# Patient Record
Sex: FEMALE | Race: WHITE | NOT HISPANIC OR LATINO | URBAN - METROPOLITAN AREA
[De-identification: names, ages, dates, MRNs, and addresses within clinical notes are randomized per-mention and may not be internally consistent; named-entity substitution may affect disease eponyms.]

---

## 2023-05-07 ENCOUNTER — NURSING HOME VISIT (OUTPATIENT)
Dept: POST ACUTE CARE | Facility: EXTERNAL LOCATION | Age: 88
End: 2023-05-07
Payer: MEDICARE

## 2023-05-07 DIAGNOSIS — S81.801D WOUND OF RIGHT LOWER EXTREMITY, SUBSEQUENT ENCOUNTER: Primary | ICD-10-CM

## 2023-05-07 DIAGNOSIS — D63.1 ANEMIA DUE TO STAGE 4 CHRONIC KIDNEY DISEASE (MULTI): ICD-10-CM

## 2023-05-07 DIAGNOSIS — R62.51 FAILURE TO THRIVE (0-17): ICD-10-CM

## 2023-05-07 DIAGNOSIS — I10 BENIGN ESSENTIAL HYPERTENSION: ICD-10-CM

## 2023-05-07 DIAGNOSIS — K21.9 GERD WITHOUT ESOPHAGITIS: ICD-10-CM

## 2023-05-07 DIAGNOSIS — R63.4 WEIGHT LOSS, NON-INTENTIONAL: ICD-10-CM

## 2023-05-07 DIAGNOSIS — N18.4 ANEMIA DUE TO STAGE 4 CHRONIC KIDNEY DISEASE (MULTI): ICD-10-CM

## 2023-05-07 DIAGNOSIS — N18.4 CKD (CHRONIC KIDNEY DISEASE), STAGE IV (MULTI): ICD-10-CM

## 2023-05-07 PROBLEM — S81.801A WOUND OF RIGHT LEG: Status: ACTIVE | Noted: 2023-05-07

## 2023-05-07 PROCEDURE — 99309 SBSQ NF CARE MODERATE MDM 30: CPT | Performed by: INTERNAL MEDICINE

## 2023-05-07 NOTE — PROGRESS NOTES
Patient ID: Rocio Mendieta is a 89 y.o. female who presents for Skilled services for wounds, PT/OT care plan development and nursing observation and assessment. Document wounds, monitor for infection. Monitor VS and lab work as indicated.  Failure to thrive weight loss outpatient treatment plan update labs with a local medical    Assessment/Plan     Problem List Items Addressed This Visit          Circulatory    Benign essential hypertension     Patients BP readings reviewed and addressed, as we age our arteries turn stiffer and less elastic. Restricting salt consumption and staying physically fit with regular exercise regimen is the only way to keep our vasculature less tonic. Studies have shown that keeping ideal body wt, exercise routine about 140 to 150 minutes a week, eating variety of plant based diet and drinking plentiful water are quite helpful. Monitor BP twice or once a week at home and bring log to be reviewed by me. Uncontrolled BP has long term consequences including heart failure, myocardial infarction, accelerated atherosclerosis and kidney dysfunction. Therapy reviewed and explained.             Digestive    GERD without esophagitis       Genitourinary    CKD (chronic kidney disease), stage IV (CMS/HCC)     CKD and various stages of CKD and significance explained, CKD is very common and rising diagnosis among [place] adults. Main culprits for CKD etiology needs to be attended well. GFR values explained. Nephrotoxic agents has to be avoided, plenty of water consumption is always beneficial. Avoid NSAIDs, always check with MD about usage of OTC medications or any other Rx given by other providers. GFR less then 10 usually will need renal replacement therapy. Episodic check on renal functions needed. Always ask MD about GFR at next visit. Avoid dehydration.             Musculoskeletal    Wound of right leg - Primary     Nutrition evaluation wound care evaluation local skin care            Endocrine/Metabolic  "   Weight loss, non-intentional     Boost twice a day and Remeron or Megace         Failure to thrive (0-17)     Sed rate iron B12 folic acid thyroid checkup psych and nutrition evaluation          This patient was seen for my regular monthly visit, nursing evaluations and nursing notes were reviewed, interim events are reviewed, interim concerns and messages were reviewed as we have communicated with nursing staff.  Any issues with the falls, skin care impairment, declining physical condition are reviewed and noted, diagnosis list were reviewed, list of medications were reviewed, living will related issues were reviewed, overall patient has been doing well, any declining in patient's condition or any change in patient's condition needs to be notified to physician promptly, discussed with nursing staff, if needed would communicate with family.  Patient stays confined here at the facility for long-term care, there are always concerns about long-term care related issues and concerns.  Nursing staff is trying their best to keep patient safe, all sort of measures has been taken to keep patient safe and comfortable.   Source of history: Nurse, Medical personnel, Medical record, Patient.  History limitation: None.      HPI  HPI: Rocio Mendieta is a 89 year old female patient at [place]. This provider was notified last week that patient was losing  weight. Had talked with this patient about this in the past and she states the food does not \"agree with her.\" so she does not  always eat. She states she does not have an appetite. Patients weight on admission was 146# 1/3/2023 and she is down to 134.2#  today. She was started on Remeron 7.5 mg at  on 4/13/2023. Patient took this medication for a few days and has since been  refusing the medication. She states that she does not want to take medication to increase her appetite because it does not matter if  her appetite is increased because she is still unwilling to eat " "the food, she states it is very \"unappetizing.\" She states she is having  her nieces bring her in food that she does like at least once a week and they are bringing her snacks she enjoys. She is requesting  the Remeron be Discontinued.    Histories:  1/3/2023 I12.9 HYPERTENSIVE CHRONIC KIDNEY DISEASE W STG 1-4/UNSP CHR KDNY  1/3/2023 I35.0 NONRHEUMATIC AORTIC (VALVE) STENOSIS  1/3/2023 I73.9 PERIPHERAL VASCULAR DISEASE, UNSPECIFIED  1/3/2023 D50.9 IRON DEFICIENCY ANEMIA, UNSPECIFIED  1/3/2023 K21.9 GASTRO-ESOPHAGEAL REFLUX DISEASE WITHOUT ESOPHAGITIS  1/3/2023 S91.301D UNSPECIFIED OPEN WOUND, RIGHT FOOT, SUBSEQUENT ENCOUNTER  1/3/2023 R62.7 ADULT FAILURE TO THRIVE  1/3/2023 M62.522MUSCLE WASTING AND ATROPHY, NEC, LEFT UPPER ARM  1/18/2023 L08.9 LOCAL INFECTION OF THE SKIN AND SUBCUTANEOUS TISSUE, UNSP  1/3/2023 Z74.1 NEED FOR ASSISTANCE WITH PERSONAL CARE  1/3/2023 R29.6 REPEATED FALLS  1/3/2023 R26.2 DIFFICULTY IN WALKING, NOT ELSEWHERE CLASSIFIED  1/3/2023 M62.81 MUSCLE WEAKNESS (GENERALIZED  1/3/2023 N18.4 CHRONIC KIDNEY DISEASE, STAGE 4 (SEVERE  Not on File    Medications   Order Order Status Revised Last Ordered    Ferrous Sulfate Oral Tablet Delayed Release 324 (65 Fe) MG (Ferrous Sulfate) Active 1/4/2023     Sennosides-Docusate Sodium Oral Tablet 8.6-50 MG (Sennosides-Docusate Sodium) Active 1/25/2023     Fluticasone Propionate Nasal Suspension 50 MCG/ACT (Fluticasone Propionate (Nasal)) Active 1/4/2023   This order is potentially duplicated by other orders on the chart. Click to view details.    Acetaminophen Oral Tablet 325 MG (Acetaminophen) Active 2/15/2023     Amino Acids-Protein Hydrolys Oral Liquid (Amino Acids-Protein Hydrolysate) Active 1/3/2023     Melatonin Oral Tablet 3 MG (Melatonin) Active 1/3/2023     Cholecalciferol Oral Tablet 25 MCG (1000 UT) (Cholecalciferol) Active 1/4/2023   This order is potentially duplicated by other orders on the chart. Click to view details.    Tylenol Extra Strength " Oral Tablet 500 MG (Acetaminophen) Active 1/26/2023 1/26/2023    Saccharomyces boulardii Capsule 250 MG Active 3/16/2023 3/15/2023    Dicyclomine HCl Tablet 20 MG Active 4/10/2023 4/7/2023  No current outpatient medications on file.     No current facility-administered medications for this visit.       Objective   Visit Vitals  Smoking Status Former     Current Vitals   BP: 128/61 mmHg  5/3/2023 15:42  Temp:97.8 °F  5/3/2023 15:42  Pulse:68 bpm  5/3/2023 15:42    Weight:131.9 Lbs  5/2/2023 07:51  Resp:15 Breaths/min  5/3/2023 15:42  BS:  O2:97 %  5/3/2023 15:42  Pain:0  5/3/2023 15:42  PHYSICAL EXAM  General: Weight loss HEENT: PERRLA. EOMI. MMM. Nares patent bl.  Cardiovascular: Heart murmur  Respiratory: Rhonchi  GI: Soft, NT abdomen. BS present x 4.   : No CVAT BL  MSK: Osteoporosis osteoarthritis extremities: No edema. Cap refill < 2 sec.   Skin: Hydration pressure ulcer legs  Neuro: Confusion  Psych: Depression            There is no immunization history on file for this patient.    No visits with results within 4 Month(s) from this visit.   Latest known visit with results is:   No results found for any previous visit.       Radiology: Reviewed imaging in powerchart.  No results found.    No family history on file.  Social History     Socioeconomic History    Marital status:      Spouse name: None    Number of children: None    Years of education: None    Highest education level: None   Occupational History    None   Tobacco Use    Smoking status: Former     Types: Cigarettes    Smokeless tobacco: Never   Vaping Use    Vaping status: None   Substance and Sexual Activity    Alcohol use: Not Currently    Drug use: Not Currently    Sexual activity: None   Other Topics Concern    None   Social History Narrative    None     Social Determinants of Health     Financial Resource Strain: Not on file   Food Insecurity: Not on file   Transportation Needs: Not on file   Physical Activity: Not on file   Stress:  Not on file   Social Connections: Not on file   Intimate Partner Violence: Not on file   Housing Stability: Not on file     History reviewed. No pertinent past medical history.  History reviewed. No pertinent surgical history.  * Cannot find OR log *    Charting was completed using voice recognition technology and may include unintended errors.

## 2023-05-07 NOTE — LETTER
Patient: Rocio Mendieta  : 1927    Encounter Date: 2023    Patient ID: Rocio Mendieta is a 89 y.o. female who presents for Skilled services for wounds, PT/OT care plan development and nursing observation and assessment. Document wounds, monitor for infection. Monitor VS and lab work as indicated.  Failure to thrive weight loss outpatient treatment plan update labs with a local medical    Assessment/Plan    Problem List Items Addressed This Visit          Circulatory    Benign essential hypertension     Patients BP readings reviewed and addressed, as we age our arteries turn stiffer and less elastic. Restricting salt consumption and staying physically fit with regular exercise regimen is the only way to keep our vasculature less tonic. Studies have shown that keeping ideal body wt, exercise routine about 140 to 150 minutes a week, eating variety of plant based diet and drinking plentiful water are quite helpful. Monitor BP twice or once a week at home and bring log to be reviewed by me. Uncontrolled BP has long term consequences including heart failure, myocardial infarction, accelerated atherosclerosis and kidney dysfunction. Therapy reviewed and explained.             Digestive    GERD without esophagitis       Genitourinary    CKD (chronic kidney disease), stage IV (CMS/HCC)     CKD and various stages of CKD and significance explained, CKD is very common and rising diagnosis among [place] adults. Main culprits for CKD etiology needs to be attended well. GFR values explained. Nephrotoxic agents has to be avoided, plenty of water consumption is always beneficial. Avoid NSAIDs, always check with MD about usage of OTC medications or any other Rx given by other providers. GFR less then 10 usually will need renal replacement therapy. Episodic check on renal functions needed. Always ask MD about GFR at next visit. Avoid dehydration.             Musculoskeletal    Wound of right leg - Primary     Nutrition evaluation  "wound care evaluation local skin care            Endocrine/Metabolic    Weight loss, non-intentional     Boost twice a day and Remeron or Megace         Failure to thrive (0-17)     Sed rate iron B12 folic acid thyroid checkup psych and nutrition evaluation          This patient was seen for my regular monthly visit, nursing evaluations and nursing notes were reviewed, interim events are reviewed, interim concerns and messages were reviewed as we have communicated with nursing staff.  Any issues with the falls, skin care impairment, declining physical condition are reviewed and noted, diagnosis list were reviewed, list of medications were reviewed, living will related issues were reviewed, overall patient has been doing well, any declining in patient's condition or any change in patient's condition needs to be notified to physician promptly, discussed with nursing staff, if needed would communicate with family.  Patient stays confined here at the facility for long-term care, there are always concerns about long-term care related issues and concerns.  Nursing staff is trying their best to keep patient safe, all sort of measures has been taken to keep patient safe and comfortable.   Source of history: Nurse, Medical personnel, Medical record, Patient.  History limitation: None.      HPI  HPI: Rocio Mendieta is a 89 year old female patient at [place]. This provider was notified last week that patient was losing  weight. Had talked with this patient about this in the past and she states the food does not \"agree with her.\" so she does not  always eat. She states she does not have an appetite. Patients weight on admission was 146# 1/3/2023 and she is down to 134.2#  today. She was started on Remeron 7.5 mg at  on 4/13/2023. Patient took this medication for a few days and has since been  refusing the medication. She states that she does not want to take medication to increase her appetite because it does not matter " "if  her appetite is increased because she is still unwilling to eat the food, she states it is very \"unappetizing.\" She states she is having  her nieces bring her in food that she does like at least once a week and they are bringing her snacks she enjoys. She is requesting  the Remeron be Discontinued.    Histories:  1/3/2023 I12.9 HYPERTENSIVE CHRONIC KIDNEY DISEASE W STG 1-4/UNSP CHR KDNY  1/3/2023 I35.0 NONRHEUMATIC AORTIC (VALVE) STENOSIS  1/3/2023 I73.9 PERIPHERAL VASCULAR DISEASE, UNSPECIFIED  1/3/2023 D50.9 IRON DEFICIENCY ANEMIA, UNSPECIFIED  1/3/2023 K21.9 GASTRO-ESOPHAGEAL REFLUX DISEASE WITHOUT ESOPHAGITIS  1/3/2023 S91.301D UNSPECIFIED OPEN WOUND, RIGHT FOOT, SUBSEQUENT ENCOUNTER  1/3/2023 R62.7 ADULT FAILURE TO THRIVE  1/3/2023 M62.522MUSCLE WASTING AND ATROPHY, NEC, LEFT UPPER ARM  1/18/2023 L08.9 LOCAL INFECTION OF THE SKIN AND SUBCUTANEOUS TISSUE, UNSP  1/3/2023 Z74.1 NEED FOR ASSISTANCE WITH PERSONAL CARE  1/3/2023 R29.6 REPEATED FALLS  1/3/2023 R26.2 DIFFICULTY IN WALKING, NOT ELSEWHERE CLASSIFIED  1/3/2023 M62.81 MUSCLE WEAKNESS (GENERALIZED  1/3/2023 N18.4 CHRONIC KIDNEY DISEASE, STAGE 4 (SEVERE  Not on File    Medications   Order Order Status Revised Last Ordered    Ferrous Sulfate Oral Tablet Delayed Release 324 (65 Fe) MG (Ferrous Sulfate) Active 1/4/2023     Sennosides-Docusate Sodium Oral Tablet 8.6-50 MG (Sennosides-Docusate Sodium) Active 1/25/2023     Fluticasone Propionate Nasal Suspension 50 MCG/ACT (Fluticasone Propionate (Nasal)) Active 1/4/2023   This order is potentially duplicated by other orders on the chart. Click to view details.    Acetaminophen Oral Tablet 325 MG (Acetaminophen) Active 2/15/2023     Amino Acids-Protein Hydrolys Oral Liquid (Amino Acids-Protein Hydrolysate) Active 1/3/2023     Melatonin Oral Tablet 3 MG (Melatonin) Active 1/3/2023     Cholecalciferol Oral Tablet 25 MCG (1000 UT) (Cholecalciferol) Active 1/4/2023   This order is potentially duplicated by other " orders on the chart. Click to view details.    Tylenol Extra Strength Oral Tablet 500 MG (Acetaminophen) Active 1/26/2023 1/26/2023    Saccharomyces boulardii Capsule 250 MG Active 3/16/2023 3/15/2023    Dicyclomine HCl Tablet 20 MG Active 4/10/2023 4/7/2023  No current outpatient medications on file.     No current facility-administered medications for this visit.       Objective  Visit Vitals  Smoking Status Former     Current Vitals   BP: 128/61 mmHg  5/3/2023 15:42  Temp:97.8 °F  5/3/2023 15:42  Pulse:68 bpm  5/3/2023 15:42    Weight:131.9 Lbs  5/2/2023 07:51  Resp:15 Breaths/min  5/3/2023 15:42  BS:  O2:97 %  5/3/2023 15:42  Pain:0  5/3/2023 15:42  PHYSICAL EXAM  General: Weight loss HEENT: PERRLA. EOMI. MMM. Nares patent bl.  Cardiovascular: Heart murmur  Respiratory: Rhonchi  GI: Soft, NT abdomen. BS present x 4.   : No CVAT BL  MSK: Osteoporosis osteoarthritis extremities: No edema. Cap refill < 2 sec.   Skin: Hydration pressure ulcer legs  Neuro: Confusion  Psych: Depression            There is no immunization history on file for this patient.    No visits with results within 4 Month(s) from this visit.   Latest known visit with results is:   No results found for any previous visit.       Radiology: Reviewed imaging in powerchart.  No results found.    No family history on file.  Social History     Socioeconomic History   • Marital status:      Spouse name: None   • Number of children: None   • Years of education: None   • Highest education level: None   Occupational History   • None   Tobacco Use   • Smoking status: Former     Types: Cigarettes   • Smokeless tobacco: Never   Vaping Use   • Vaping status: None   Substance and Sexual Activity   • Alcohol use: Not Currently   • Drug use: Not Currently   • Sexual activity: None   Other Topics Concern   • None   Social History Narrative   • None     Social Determinants of Health     Financial Resource Strain: Not on file   Food Insecurity: Not on file    Transportation Needs: Not on file   Physical Activity: Not on file   Stress: Not on file   Social Connections: Not on file   Intimate Partner Violence: Not on file   Housing Stability: Not on file     History reviewed. No pertinent past medical history.  History reviewed. No pertinent surgical history.  * Cannot find OR log *    Charting was completed using voice recognition technology and may include unintended errors.           Electronically Signed By: Hari Galvan MD   5/7/23  3:05 PM

## 2023-05-07 NOTE — ASSESSMENT & PLAN NOTE
Patients BP readings reviewed and addressed, as we age our arteries turn stiffer and less elastic. Restricting salt consumption and staying physically fit with regular exercise regimen is the only way to keep our vasculature less tonic. Studies have shown that keeping ideal body wt, exercise routine about 140 to 150 minutes a week, eating variety of plant based diet and drinking plentiful water are quite helpful. Monitor BP twice or once a week at home and bring log to be reviewed by me. Uncontrolled BP has long term consequences including heart failure, myocardial infarction, accelerated atherosclerosis and kidney dysfunction. Therapy reviewed and explained.

## 2023-07-12 ENCOUNTER — NURSING HOME VISIT (OUTPATIENT)
Dept: POST ACUTE CARE | Facility: EXTERNAL LOCATION | Age: 88
End: 2023-07-12
Payer: MEDICARE

## 2023-07-12 DIAGNOSIS — S81.801D WOUND OF RIGHT LOWER EXTREMITY, SUBSEQUENT ENCOUNTER: Primary | ICD-10-CM

## 2023-07-12 DIAGNOSIS — K21.9 GERD WITHOUT ESOPHAGITIS: ICD-10-CM

## 2023-07-12 DIAGNOSIS — R63.4 WEIGHT LOSS, NON-INTENTIONAL: ICD-10-CM

## 2023-07-12 DIAGNOSIS — N18.4 CKD (CHRONIC KIDNEY DISEASE), STAGE IV (MULTI): ICD-10-CM

## 2023-07-12 DIAGNOSIS — I10 BENIGN ESSENTIAL HYPERTENSION: ICD-10-CM

## 2023-07-12 DIAGNOSIS — R62.51 FAILURE TO THRIVE (0-17): ICD-10-CM

## 2023-07-12 PROCEDURE — 99308 SBSQ NF CARE LOW MDM 20: CPT | Performed by: INTERNAL MEDICINE

## 2023-07-12 NOTE — LETTER
Patient: Rocio Mendieta  : 1927    Encounter Date: 2023    Name: Rocio Mendieta  YOB: 1927    FOLLOW UP VISIT:   Allergies: Egg, Fish   Code Status: DNR-CC    Special Instructions: Skilled services for wounds, PT/OT care plan development and nursing observation and assessment. Document wounds, monitor for infection. Monitor VS and lab work as indicated.   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 STAGE 4 CHRONIC KIDNEY DISEASE, OR UNSPECIFIED CHRONIC KIDNEY DISEASE   SUBJECTIVE:This is a 89-year-old patient who had a left lower calf wound IBS anxiety depression insomnia anemia evaluated for pain wound care discussed with RN discussed with the nursing home nurse nurse practitioner advised wound care evaluation dietitian evaluation check sed rate CBC BMP chronic kidney disease associated with the hypertension monitor kidney function liver function    REVIEW OF SYSTEMS:   All review of systems are negative unless otherwise stated above under subjective.    LABS REVIEWED AT FACILITY:    MEDICATIONS REVIEWED AT FACILITY:   Order Order Status Revised Last Ordered    Ferrous Sulfate Oral Tablet Delayed Release 324 (65 Fe) MG (Ferrous Sulfate) Active 2023     Sennosides-Docusate Sodium Oral Tablet 8.6-50 MG (Sennosides-Docusate Sodium) Active 2023     Fluticasone Propionate Nasal Suspension 50 MCG/ACT (Fluticasone Propionate (Nasal)) Active 2023   This order is potentially duplicated by other orders on the chart. Click to view details.    Acetaminophen Oral Tablet 325 MG (Acetaminophen) Active 2/15/2023     Melatonin Oral Tablet 3 MG (Melatonin) Active 1/3/2023     Cholecalciferol Oral Tablet 25 MCG (1000 UT) (Cholecalciferol) Active 2023   This order is potentially duplicated by other orders on the chart. Click to view details.    Tylenol Extra Strength Oral Tablet 500 MG  (Acetaminophen) Active 1/26/2023 1/26/2023    Saccharomyces boulardii Capsule 250 MG Active 3/16/2023 3/15/2023    Dicyclomine HCl Tablet 20 MG  Living will related issues reviewed-Code status:     OBJECTIVE: ICD-10 Short Description  1/3/2023    I12.9 HYPERTENSIVE CHRONIC KIDNEY DISEASE W STG 1-4/UNSP CHR KDNY  1/3/2023    I35.0 NONRHEUMATIC AORTIC (VALVE) STENOSIS  1/3/2023    I73.9 PERIPHERAL VASCULAR DISEASE, UNSPECIFIED  1/3/2023    D50.9 IRON DEFICIENCY ANEMIA, UNSPECIFIED  1/3/2023    K21.9 GASTRO-ESOPHAGEAL REFLUX DISEASE WITHOUT ESOPHAGITIS  1/3/2023    S91.301D UNSPECIFIED OPEN WOUND, RIGHT FOOT, SUBSEQUENT ENCOUNTER  1/3/2023    R62.7 ADULT FAILURE TO THRIVE  1/3/2023    M62.522 MUSCLE WASTING AND ATROPHY, NEC, LEFT UPPER ARM  1/18/2023    L08.9 LOCAL INFECTION OF THE SKIN AND SUBCUTANEOUS TISSUE, UNSP  1/3/2023    Z74.1 NEED FOR ASSISTANCE WITH PERSONAL CARE  1/3/2023    R29.6 REPEATED FALLS  1/3/2023    R26.2 DIFFICULTY IN WALKING, NOT ELSEWHERE CLASSIFIED  1/3/2023    M62.81 MUSCLE WEAKNESS (GENERALIZED  1/3/2023    N18.4 CHRONIC KIDNEY DISEASE, STAGE 4 (SEVERE  There were no vitals taken for this visit.  Physical Exam    Current Vitals   BP: 132/66 mmHg  5/12/2023 14:30  Temp:97.8 °F  5/12/2023 14:30  Pulse:69 bpm  5/12/2023 14:30  Weight:138.2 Lbs  7/5/2023 04:49  Resp:15 Breaths/min  5/12/2023 14:30  BS:  O2:97 %  5/12/2023 14:30  Pain:0  7/17/2023 07:38  Physical Exam:    Appearance: Alert, oriented , cooperative,  in no acute distress. Well nourished & well hydrated.    Skin: Pressure ulcer stage second and third    Eyes: PERRLA, EOMs intact,  Conjunctiva pink with no redness or exudates. Cornea & anterior chamber are clear, Eyelids without lesions. No scleral icterus.     ENT: Hearing grossly intact. External auditory canals patent, tympanic membranes intact with visible landmarks. Nares patent, mucus membranes moist. Dentition without lesions. Pharynx clear, uvula midline.     Neck: JVD  plus  Pulmonary: Crackles rales rhonchi  Cardiac: Heart murmur  Abdomen: Soft, nontender, active bowel sounds.  No palpable organomegaly.  No rebound or guarding.  No CVA tenderness.    Genitourinary: Exam deferred.    Musculoskeletal: Hip pain back pain tenderness  Neurological: Anxiety depression dementia    Psychiatric: Appropriate mood and affect.     Assessment/Plan  Problem List Items Addressed This Visit          Cardiac and Vasculature    Benign essential hypertension       Endocrine/Metabolic    Weight loss, non-intentional    Failure to thrive (0-17)       Gastrointestinal and Abdominal    GERD without esophagitis       Genitourinary and Reproductive    CKD (chronic kidney disease), stage IV (CMS/[facility])       Musculoskeletal and Injuries    Wound of right leg - Primary     This patient was seen for my regular monthly visit, nursing evaluations and nursing notes were reviewed, interim events are reviewed, interim concerns and messages were reviewed as we have communicated with nursing staff.  Any issues with the falls, skin care impairment, declining physical condition are reviewed and noted, diagnosis list were reviewed, list of medications were reviewed, living will related issues were reviewed, overall patient has been doing well, any declining in patient's condition or any change in patient's condition needs to be notified to physician promptly, discussed with nursing staff, if needed would communicate with family.  Patient stays confined here at the facility for long-term care, there are always concerns about long-term care related issues and concerns.  Nursing staff is trying their best to keep patient safe, all sort of measures has been taken to keep patient safe and comfortable.  Consultation taken to the wound care evaluation dietitian evaluation check the CBC BMP TSH protein level  Skin integrity:  Nursing to monitor skin integrity as patient is at risk for pressure injuries.  Wound care per  nursing    See Facility notes for measurements/assessments  Turn and reposition Q 2 hours or more  Air mattress and when up in chair cushion reducing device  Dietician to evaluate and recommend:  Nutritional supplement:  Please monitor skin integrity and other pressure areas  Referral to wound clinic if appropriate:    PLAN:  Pt has been seen for follow up visit.  The patient is here at facility for PT/OT/ST to maximize strength, function, endurance and safety.  The patient is participating in therapy. Rocio Mendieta is making progress to the best of his/her ability.   Please see PT/OT/ST notes in the facility for detailed information regarding progression of patients progress.   Recent nursing evaluation and notes were reviewed.   Overall, patient is stable despite his/her chronic conditions.    #  Any decline or change in condition needs to be communicated with the physician or myself.    Discussion with nursing staff regarding ongoing care and management.  If needed, would communicate with family who are not present at this time.   There are no concerns at this time.  We will continue with the medications noted above.    We will continue to follow the patient here at the facility.    Discharge planning:   Discharge Home when goals are met.   Follow up with PCP in 1-2 weeks after discharge from facility.   [facility] to follow after discharge for continued PT/OT and Nursing.    *Please note that nursing facility, outside laboratory agency, and  [facility] do not interface.     Completion of the note was done through Dragon voice recognition technology and may include   unintended or grammatical errors which may not have been recognized when finalizing the note.     Time:    Hari Galvan MD         Electronically Signed By: Hari Galvan MD   7/17/23  4:30 PM

## 2023-07-17 NOTE — PROGRESS NOTES
Name: Rocio Mendieta  YOB: 1927    FOLLOW UP VISIT:   Allergies: Egg, Fish   Code Status: DNR-CC    Special Instructions: Skilled services for wounds, PT/OT care plan development and nursing observation and assessment. Document wounds, monitor for infection. Monitor VS and lab work as indicated.   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 STAGE 4 CHRONIC KIDNEY DISEASE, OR UNSPECIFIED CHRONIC KIDNEY DISEASE   SUBJECTIVE:This is a 89-year-old patient who had a left lower calf wound IBS anxiety depression insomnia anemia evaluated for pain wound care discussed with RN discussed with the nursing home nurse nurse practitioner advised wound care evaluation dietitian evaluation check sed rate CBC BMP chronic kidney disease associated with the hypertension monitor kidney function liver function    REVIEW OF SYSTEMS:   All review of systems are negative unless otherwise stated above under subjective.    LABS REVIEWED AT FACILITY:    MEDICATIONS REVIEWED AT FACILITY:   Order Order Status Revised Last Ordered    Ferrous Sulfate Oral Tablet Delayed Release 324 (65 Fe) MG (Ferrous Sulfate) Active 1/4/2023     Sennosides-Docusate Sodium Oral Tablet 8.6-50 MG (Sennosides-Docusate Sodium) Active 1/25/2023     Fluticasone Propionate Nasal Suspension 50 MCG/ACT (Fluticasone Propionate (Nasal)) Active 1/4/2023   This order is potentially duplicated by other orders on the chart. Click to view details.    Acetaminophen Oral Tablet 325 MG (Acetaminophen) Active 2/15/2023     Melatonin Oral Tablet 3 MG (Melatonin) Active 1/3/2023     Cholecalciferol Oral Tablet 25 MCG (1000 UT) (Cholecalciferol) Active 1/4/2023   This order is potentially duplicated by other orders on the chart. Click to view details.    Tylenol Extra Strength Oral Tablet 500 MG (Acetaminophen) Active 1/26/2023 1/26/2023    Saccharomyces boulardii Capsule 250  MG Active 3/16/2023 3/15/2023    Dicyclomine HCl Tablet 20 MG  Living will related issues reviewed-Code status:     OBJECTIVE: ICD-10 Short Description  1/3/2023    I12.9 HYPERTENSIVE CHRONIC KIDNEY DISEASE W STG 1-4/UNSP CHR KDNY  1/3/2023    I35.0 NONRHEUMATIC AORTIC (VALVE) STENOSIS  1/3/2023    I73.9 PERIPHERAL VASCULAR DISEASE, UNSPECIFIED  1/3/2023    D50.9 IRON DEFICIENCY ANEMIA, UNSPECIFIED  1/3/2023    K21.9 GASTRO-ESOPHAGEAL REFLUX DISEASE WITHOUT ESOPHAGITIS  1/3/2023    S91.301D UNSPECIFIED OPEN WOUND, RIGHT FOOT, SUBSEQUENT ENCOUNTER  1/3/2023    R62.7 ADULT FAILURE TO THRIVE  1/3/2023    M62.522 MUSCLE WASTING AND ATROPHY, NEC, LEFT UPPER ARM  1/18/2023    L08.9 LOCAL INFECTION OF THE SKIN AND SUBCUTANEOUS TISSUE, UNSP  1/3/2023    Z74.1 NEED FOR ASSISTANCE WITH PERSONAL CARE  1/3/2023    R29.6 REPEATED FALLS  1/3/2023    R26.2 DIFFICULTY IN WALKING, NOT ELSEWHERE CLASSIFIED  1/3/2023    M62.81 MUSCLE WEAKNESS (GENERALIZED  1/3/2023    N18.4 CHRONIC KIDNEY DISEASE, STAGE 4 (SEVERE  There were no vitals taken for this visit.  Physical Exam    Current Vitals   BP: 132/66 mmHg  5/12/2023 14:30  Temp:97.8 °F  5/12/2023 14:30  Pulse:69 bpm  5/12/2023 14:30  Weight:138.2 Lbs  7/5/2023 04:49  Resp:15 Breaths/min  5/12/2023 14:30  BS:  O2:97 %  5/12/2023 14:30  Pain:0  7/17/2023 07:38  Physical Exam:    Appearance: Alert, oriented , cooperative,  in no acute distress. Well nourished & well hydrated.    Skin: Pressure ulcer stage second and third    Eyes: PERRLA, EOMs intact,  Conjunctiva pink with no redness or exudates. Cornea & anterior chamber are clear, Eyelids without lesions. No scleral icterus.     ENT: Hearing grossly intact. External auditory canals patent, tympanic membranes intact with visible landmarks. Nares patent, mucus membranes moist. Dentition without lesions. Pharynx clear, uvula midline.     Neck: JVD plus  Pulmonary: Crackles rales rhonchi  Cardiac: Heart murmur  Abdomen: Soft, nontender,  active bowel sounds.  No palpable organomegaly.  No rebound or guarding.  No CVA tenderness.    Genitourinary: Exam deferred.    Musculoskeletal: Hip pain back pain tenderness  Neurological: Anxiety depression dementia    Psychiatric: Appropriate mood and affect.     Assessment/Plan   Problem List Items Addressed This Visit          Cardiac and Vasculature    Benign essential hypertension       Endocrine/Metabolic    Weight loss, non-intentional    Failure to thrive (0-17)       Gastrointestinal and Abdominal    GERD without esophagitis       Genitourinary and Reproductive    CKD (chronic kidney disease), stage IV (CMS/[facility])       Musculoskeletal and Injuries    Wound of right leg - Primary     This patient was seen for my regular monthly visit, nursing evaluations and nursing notes were reviewed, interim events are reviewed, interim concerns and messages were reviewed as we have communicated with nursing staff.  Any issues with the falls, skin care impairment, declining physical condition are reviewed and noted, diagnosis list were reviewed, list of medications were reviewed, living will related issues were reviewed, overall patient has been doing well, any declining in patient's condition or any change in patient's condition needs to be notified to physician promptly, discussed with nursing staff, if needed would communicate with family.  Patient stays confined here at the facility for long-term care, there are always concerns about long-term care related issues and concerns.  Nursing staff is trying their best to keep patient safe, all sort of measures has been taken to keep patient safe and comfortable.  Consultation taken to the wound care evaluation dietitian evaluation check the CBC BMP TSH protein level  Skin integrity:  Nursing to monitor skin integrity as patient is at risk for pressure injuries.  Wound care per nursing    See Facility notes for measurements/assessments  Turn and reposition Q 2 hours or  more  Air mattress and when up in chair cushion reducing device  Dietician to evaluate and recommend:  Nutritional supplement:  Please monitor skin integrity and other pressure areas  Referral to wound clinic if appropriate:    PLAN:  Pt has been seen for follow up visit.  The patient is here at facility for PT/OT/ST to maximize strength, function, endurance and safety.  The patient is participating in therapy. Rocio Mendieta is making progress to the best of his/her ability.   Please see PT/OT/ST notes in the facility for detailed information regarding progression of patients progress.   Recent nursing evaluation and notes were reviewed.   Overall, patient is stable despite his/her chronic conditions.    #  Any decline or change in condition needs to be communicated with the physician or myself.    Discussion with nursing staff regarding ongoing care and management.  If needed, would communicate with family who are not present at this time.   There are no concerns at this time.  We will continue with the medications noted above.    We will continue to follow the patient here at the facility.    Discharge planning:   Discharge Home when goals are met.   Follow up with PCP in 1-2 weeks after discharge from facility.   [facility] to follow after discharge for continued PT/OT and Nursing.    *Please note that nursing facility, outside laboratory agency, and  [facility] do not interface.     Completion of the note was done through Dragon voice recognition technology and may include   unintended or grammatical errors which may not have been recognized when finalizing the note.     Time:    Hari Galvan MD

## 2023-08-20 ENCOUNTER — NURSING HOME VISIT (OUTPATIENT)
Dept: POST ACUTE CARE | Facility: EXTERNAL LOCATION | Age: 88
End: 2023-08-20
Payer: MEDICARE

## 2023-08-20 DIAGNOSIS — K21.9 GERD WITHOUT ESOPHAGITIS: ICD-10-CM

## 2023-08-20 DIAGNOSIS — N18.4 ANEMIA DUE TO STAGE 4 CHRONIC KIDNEY DISEASE (MULTI): ICD-10-CM

## 2023-08-20 DIAGNOSIS — R63.4 WEIGHT LOSS, NON-INTENTIONAL: ICD-10-CM

## 2023-08-20 DIAGNOSIS — N18.4 CKD (CHRONIC KIDNEY DISEASE), STAGE IV (MULTI): ICD-10-CM

## 2023-08-20 DIAGNOSIS — I10 BENIGN ESSENTIAL HYPERTENSION: ICD-10-CM

## 2023-08-20 DIAGNOSIS — S81.801D WOUND OF RIGHT LOWER EXTREMITY, SUBSEQUENT ENCOUNTER: Primary | ICD-10-CM

## 2023-08-20 DIAGNOSIS — D63.1 ANEMIA DUE TO STAGE 4 CHRONIC KIDNEY DISEASE (MULTI): ICD-10-CM

## 2023-08-20 PROCEDURE — 99309 SBSQ NF CARE MODERATE MDM 30: CPT | Performed by: INTERNAL MEDICINE

## 2023-08-20 NOTE — LETTER
Patient: Rocio Mendieta  : 1927    Encounter Date: 2023    Name: Rocio Mendieta  YOB: 1927    FOLLOW UP VISIT:   Allergies: Egg, Fish   Code Status: DNR-CC    Special Instructions: Skilled services for wounds, PT/OT care plan development and nursing observation and assessment. Document wounds, monitor for infection. Monitor VS and lab work as indicated.   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 STAGE 4 CHRONIC KIDNEY DISEASE, OR UNSPECIFIED CHRONIC KIDNEY DISEASE   SUBJECTIVE:  89-year-old patient DNR CC evaluated for kidney disease anemia skin irritation wound care evaluation advise regular house diet thin liquid consistency ammonium lactate cream application left lower calf wound care management iron B12 folic acid supplement mild dehydration with renal insufficiency increase fluid intake all nephrotoxic medication        WBC 8.16 H&H 11.3 and 36.6 BUN of 42 creatinine 1.9 hemoglobin A1c 5.4    Anemia with chronic kidney disease    REVIEW OF SYSTEMS:   All review of systems are negative unless otherwise stated above under subjective.    LABS REVIEWED AT FACILITY:  Review  MEDICATIONS REVIEWED AT FACILITY:   Order Order Status Revised Last Ordered    Ferrous Sulfate Oral Tablet Delayed Release 324 (65 Fe) MG (Ferrous Sulfate) Active 2023     Sennosides-Docusate Sodium Oral Tablet 8.6-50 MG (Sennosides-Docusate Sodium) Active 2023     Fluticasone Propionate Nasal Suspension 50 MCG/ACT (Fluticasone Propionate (Nasal)) Active 2023   This order is potentially duplicated by other orders on the chart. Click to view details.    Acetaminophen Oral Tablet 325 MG (Acetaminophen) Active 2/15/2023     Melatonin Oral Tablet 3 MG (Melatonin) Active 1/3/2023     Cholecalciferol Oral Tablet 25 MCG (1000 UT) (Cholecalciferol) Active 2023   This order is potentially duplicated by other orders on the  chart. Click to view details.    Tylenol Extra Strength Oral Tablet 500 MG (Acetaminophen) Active 1/26/2023 1/26/2023    Saccharomyces boulardii Capsule 250 MG Active 3/16/2023 3/15/2023    Dicyclomine HCl Tablet 20 MG  Living will related issues reviewed-Code status:     OBJECTIVE: ICD-10 Short Description  1/3/2023    I12.9 HYPERTENSIVE CHRONIC KIDNEY DISEASE W STG 1-4/UNSP CHR KDNY  1/3/2023    I35.0 NONRHEUMATIC AORTIC (VALVE) STENOSIS  1/3/2023    I73.9 PERIPHERAL VASCULAR DISEASE, UNSPECIFIED  1/3/2023    D50.9 IRON DEFICIENCY ANEMIA, UNSPECIFIED  1/3/2023    K21.9 GASTRO-ESOPHAGEAL REFLUX DISEASE WITHOUT ESOPHAGITIS  1/3/2023    S91.301D UNSPECIFIED OPEN WOUND, RIGHT FOOT, SUBSEQUENT ENCOUNTER  1/3/2023    R62.7 ADULT FAILURE TO THRIVE  1/3/2023    M62.522 MUSCLE WASTING AND ATROPHY, NEC, LEFT UPPER ARM  1/18/2023    L08.9 LOCAL INFECTION OF THE SKIN AND SUBCUTANEOUS TISSUE, UNSP  1/3/2023    Z74.1 NEED FOR ASSISTANCE WITH PERSONAL CARE  1/3/2023    R29.6 REPEATED FALLS  1/3/2023    R26.2 DIFFICULTY IN WALKING, NOT ELSEWHERE CLASSIFIED  1/3/2023    M62.81 MUSCLE WEAKNESS (GENERALIZED  1/3/2023    N18.4 CHRONIC KIDNEY DISEASE, STAGE 4 (SEVERE  There were no vitals taken for this visit.  Physical Exam      Current Vitals   BP: 132/66 mmHg  5/12/2023 14:30  Temp:97.8 °F  5/12/2023 14:30  Pulse:69 bpm  5/12/2023 14:30  Weight:139.6 Lbs  8/14/2023 09:23  Resp:15 Breaths/min  5/12/2023 14:30  BS:  O2:97 %  5/12/2023 14:30  Pain:0  8/20/2023 09:13  Appearance: Alert, oriented , cooperative,  in no acute distress. Well nourished & well hydrated.    Skin: Pressure ulcer stage second and third left leg calf pressure ulcer    Eyes: PERRLA, EOMs intact,  Conjunctiva pink with no redness or exudates. Cornea & anterior chamber are clear, Eyelids without lesions. No scleral icterus.     ENT: Minimal wax  Neck: JVD plus  Pulmonary: Crackles rales rhonchi  Cardiac: Heart murmur  Abdomen: Soft, nontender, active bowel sounds.   No palpable organomegaly.  No rebound or guarding.  No CVA tenderness.    Genitourinary: Exam deferred.    Musculoskeletal: Hip pain back pain tenderness  Neurological: Anxiety depression dementia    Psychiatric: Appropriate mood and affect.     Assessment/Plan  Problem List Items Addressed This Visit       Weight loss, non-intentional       Nutritional dietitian evaluation check thyroid sed rate advised Ensure Plus 3 cans a day         GERD without esophagitis    Benign essential hypertension     Patients BP readings reviewed and addressed, as we age our arteries turn stiffer and less elastic. Restricting salt consumption and staying physically fit with regular exercise regimen is the only way to keep our vasculature less tonic. Studies have shown that keeping ideal body wt, exercise routine about 140 to 150 minutes a week, eating variety of plant based diet and drinking plentiful water are quite helpful. Monitor BP twice or once a week at home and bring log to be reviewed by me. Uncontrolled BP has long term consequences including heart failure, myocardial infarction, accelerated atherosclerosis and kidney dysfunction. Therapy reviewed and explained.           Anemia due to stage 4 chronic kidney disease (CMS/HCC)     CKD and various stages of CKD and significance explained, CKD is very common and rising diagnosis among [place] adults. Main culprits for CKD etiology needs to be attended well. GFR values explained. Nephrotoxic agents has to be avoided, plenty of water consumption is always beneficial. Avoid NSAIDs, always check with MD about usage of OTC medications or any other Rx given by other providers. GFR less then 10 usually will need renal replacement therapy. Episodic check on renal functions needed. Always ask MD about GFR at next visit. Avoid dehydration.          Wound of right leg - Primary   This patient was seen for my regular monthly visit, nursing evaluations and nursing notes were reviewed, interim  events are reviewed, interim concerns and messages were reviewed as we have communicated with nursing staff.  Any issues with the falls, skin care impairment, declining physical condition are reviewed and noted, diagnosis list were reviewed, list of medications were reviewed, living will related issues were reviewed, overall patient has been doing well, any declining in patient's condition or any change in patient's condition needs to be notified to physician promptly, discussed with nursing staff, if needed would communicate with family.  Patient stays confined here at the facility for long-term care, there are always concerns about long-term care related issues and concerns.  Nursing staff is trying their best to keep patient safe, all sort of measures has been taken to keep patient safe and comfortable.  Consultation taken to the wound care evaluation dietitian evaluation check the CBC BMP TSH protein level  Skin integrity:  Nursing to monitor skin integrity as patient is at risk for pressure injuries.  Wound care per nursing    See Facility notes for measurements/assessments  Turn and reposition Q 2 hours or more  Air mattress and when up in chair cushion reducing device  Dietician to evaluate and recommend:  Nutritional supplement:  Please monitor skin integrity and other pressure areas  Referral to wound clinic if appropriate:    PLAN:  Pt has been seen for follow up visit.  The patient is here at facility for PT/OT/ST to maximize strength, function, endurance and safety.  The patient is participating in therapy. Rocio Mendieta is making progress to the best of his/her ability.   Please see PT/OT/ST notes in the facility for detailed information regarding progression of patients progress.   Recent nursing evaluation and notes were reviewed.   Overall, patient is stable despite his/her chronic conditions.    #  Any decline or change in condition needs to be communicated with the physician or myself.    Discussion  with nursing staff regarding ongoing care and management.  If needed, would communicate with family who are not present at this time.   There are no concerns at this time.  We will continue with the medications noted above.    We will continue to follow the patient here at the facility.    Discharge planning:   Discharge Home when goals are met.   Follow up with PCP in 1-2 weeks after discharge from facility.   [facility] to follow after discharge for continued PT/OT and Nursing.    *Please note that nursing facility, outside laboratory agency, and [facility] do not interface.     Completion of the note was done through Dragon voice recognition technology and may include   unintended or grammatical errors which may not have been recognized when finalizing the note.     Time:    Hari Galvan MD         Electronically Signed By: Hari Galvan MD   8/20/23  1:51 PM

## 2023-08-20 NOTE — PROGRESS NOTES
Name: Rocio Mendieta  YOB: 1927    FOLLOW UP VISIT:   Allergies: Egg, Fish   Code Status: DNR-CC    Special Instructions: Skilled services for wounds, PT/OT care plan development and nursing observation and assessment. Document wounds, monitor for infection. Monitor VS and lab work as indicated.   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 STAGE 4 CHRONIC KIDNEY DISEASE, OR UNSPECIFIED CHRONIC KIDNEY DISEASE   SUBJECTIVE:  89-year-old patient DNR CC evaluated for kidney disease anemia skin irritation wound care evaluation advise regular house diet thin liquid consistency ammonium lactate cream application left lower calf wound care management iron B12 folic acid supplement mild dehydration with renal insufficiency increase fluid intake all nephrotoxic medication        WBC 8.16 H&H 11.3 and 36.6 BUN of 42 creatinine 1.9 hemoglobin A1c 5.4    Anemia with chronic kidney disease    REVIEW OF SYSTEMS:   All review of systems are negative unless otherwise stated above under subjective.    LABS REVIEWED AT FACILITY:  Review  MEDICATIONS REVIEWED AT FACILITY:   Order Order Status Revised Last Ordered    Ferrous Sulfate Oral Tablet Delayed Release 324 (65 Fe) MG (Ferrous Sulfate) Active 1/4/2023     Sennosides-Docusate Sodium Oral Tablet 8.6-50 MG (Sennosides-Docusate Sodium) Active 1/25/2023     Fluticasone Propionate Nasal Suspension 50 MCG/ACT (Fluticasone Propionate (Nasal)) Active 1/4/2023   This order is potentially duplicated by other orders on the chart. Click to view details.    Acetaminophen Oral Tablet 325 MG (Acetaminophen) Active 2/15/2023     Melatonin Oral Tablet 3 MG (Melatonin) Active 1/3/2023     Cholecalciferol Oral Tablet 25 MCG (1000 UT) (Cholecalciferol) Active 1/4/2023   This order is potentially duplicated by other orders on the chart. Click to view details.    Tylenol Extra Strength Oral Tablet 500  MG (Acetaminophen) Active 1/26/2023 1/26/2023    Saccharomyces boulardii Capsule 250 MG Active 3/16/2023 3/15/2023    Dicyclomine HCl Tablet 20 MG  Living will related issues reviewed-Code status:     OBJECTIVE: ICD-10 Short Description  1/3/2023    I12.9 HYPERTENSIVE CHRONIC KIDNEY DISEASE W STG 1-4/UNSP CHR KDNY  1/3/2023    I35.0 NONRHEUMATIC AORTIC (VALVE) STENOSIS  1/3/2023    I73.9 PERIPHERAL VASCULAR DISEASE, UNSPECIFIED  1/3/2023    D50.9 IRON DEFICIENCY ANEMIA, UNSPECIFIED  1/3/2023    K21.9 GASTRO-ESOPHAGEAL REFLUX DISEASE WITHOUT ESOPHAGITIS  1/3/2023    S91.301D UNSPECIFIED OPEN WOUND, RIGHT FOOT, SUBSEQUENT ENCOUNTER  1/3/2023    R62.7 ADULT FAILURE TO THRIVE  1/3/2023    M62.522 MUSCLE WASTING AND ATROPHY, NEC, LEFT UPPER ARM  1/18/2023    L08.9 LOCAL INFECTION OF THE SKIN AND SUBCUTANEOUS TISSUE, UNSP  1/3/2023    Z74.1 NEED FOR ASSISTANCE WITH PERSONAL CARE  1/3/2023    R29.6 REPEATED FALLS  1/3/2023    R26.2 DIFFICULTY IN WALKING, NOT ELSEWHERE CLASSIFIED  1/3/2023    M62.81 MUSCLE WEAKNESS (GENERALIZED  1/3/2023    N18.4 CHRONIC KIDNEY DISEASE, STAGE 4 (SEVERE  There were no vitals taken for this visit.  Physical Exam      Current Vitals   BP: 132/66 mmHg  5/12/2023 14:30  Temp:97.8 °F  5/12/2023 14:30  Pulse:69 bpm  5/12/2023 14:30  Weight:139.6 Lbs  8/14/2023 09:23  Resp:15 Breaths/min  5/12/2023 14:30  BS:  O2:97 %  5/12/2023 14:30  Pain:0  8/20/2023 09:13  Appearance: Alert, oriented , cooperative,  in no acute distress. Well nourished & well hydrated.    Skin: Pressure ulcer stage second and third left leg calf pressure ulcer    Eyes: PERRLA, EOMs intact,  Conjunctiva pink with no redness or exudates. Cornea & anterior chamber are clear, Eyelids without lesions. No scleral icterus.     ENT: Minimal wax  Neck: JVD plus  Pulmonary: Crackles rales rhonchi  Cardiac: Heart murmur  Abdomen: Soft, nontender, active bowel sounds.  No palpable organomegaly.  No rebound or guarding.  No CVA  tenderness.    Genitourinary: Exam deferred.    Musculoskeletal: Hip pain back pain tenderness  Neurological: Anxiety depression dementia    Psychiatric: Appropriate mood and affect.     Assessment/Plan   Problem List Items Addressed This Visit       Weight loss, non-intentional       Nutritional dietitian evaluation check thyroid sed rate advised Ensure Plus 3 cans a day         GERD without esophagitis    Benign essential hypertension     Patients BP readings reviewed and addressed, as we age our arteries turn stiffer and less elastic. Restricting salt consumption and staying physically fit with regular exercise regimen is the only way to keep our vasculature less tonic. Studies have shown that keeping ideal body wt, exercise routine about 140 to 150 minutes a week, eating variety of plant based diet and drinking plentiful water are quite helpful. Monitor BP twice or once a week at home and bring log to be reviewed by me. Uncontrolled BP has long term consequences including heart failure, myocardial infarction, accelerated atherosclerosis and kidney dysfunction. Therapy reviewed and explained.           Anemia due to stage 4 chronic kidney disease (CMS/HCC)     CKD and various stages of CKD and significance explained, CKD is very common and rising diagnosis among [place] adults. Main culprits for CKD etiology needs to be attended well. GFR values explained. Nephrotoxic agents has to be avoided, plenty of water consumption is always beneficial. Avoid NSAIDs, always check with MD about usage of OTC medications or any other Rx given by other providers. GFR less then 10 usually will need renal replacement therapy. Episodic check on renal functions needed. Always ask MD about GFR at next visit. Avoid dehydration.          Wound of right leg - Primary   This patient was seen for my regular monthly visit, nursing evaluations and nursing notes were reviewed, interim events are reviewed, interim concerns and messages were  reviewed as we have communicated with nursing staff.  Any issues with the falls, skin care impairment, declining physical condition are reviewed and noted, diagnosis list were reviewed, list of medications were reviewed, living will related issues were reviewed, overall patient has been doing well, any declining in patient's condition or any change in patient's condition needs to be notified to physician promptly, discussed with nursing staff, if needed would communicate with family.  Patient stays confined here at the facility for long-term care, there are always concerns about long-term care related issues and concerns.  Nursing staff is trying their best to keep patient safe, all sort of measures has been taken to keep patient safe and comfortable.  Consultation taken to the wound care evaluation dietitian evaluation check the CBC BMP TSH protein level  Skin integrity:  Nursing to monitor skin integrity as patient is at risk for pressure injuries.  Wound care per nursing    See Facility notes for measurements/assessments  Turn and reposition Q 2 hours or more  Air mattress and when up in chair cushion reducing device  Dietician to evaluate and recommend:  Nutritional supplement:  Please monitor skin integrity and other pressure areas  Referral to wound clinic if appropriate:    PLAN:  Pt has been seen for follow up visit.  The patient is here at facility for PT/OT/ST to maximize strength, function, endurance and safety.  The patient is participating in therapy. Rocio Mendieta is making progress to the best of his/her ability.   Please see PT/OT/ST notes in the facility for detailed information regarding progression of patients progress.   Recent nursing evaluation and notes were reviewed.   Overall, patient is stable despite his/her chronic conditions.    #  Any decline or change in condition needs to be communicated with the physician or myself.    Discussion with nursing staff regarding ongoing care and  management.  If needed, would communicate with family who are not present at this time.   There are no concerns at this time.  We will continue with the medications noted above.    We will continue to follow the patient here at the facility.    Discharge planning:   Discharge Home when goals are met.   Follow up with PCP in 1-2 weeks after discharge from facility.   C to follow after discharge for continued PT/OT and Nursing.    *Please note that nursing facility, outside laboratory agency, and  [facility] do not interface.     Completion of the note was done through Dragon voice recognition technology and may include   unintended or grammatical errors which may not have been recognized when finalizing the note.     Time:    Hari Galvan MD

## 2023-09-29 ENCOUNTER — NURSING HOME VISIT (OUTPATIENT)
Dept: POST ACUTE CARE | Facility: EXTERNAL LOCATION | Age: 88
End: 2023-09-29
Payer: MEDICARE

## 2023-09-29 DIAGNOSIS — N18.4 CKD (CHRONIC KIDNEY DISEASE), STAGE IV (MULTI): Primary | ICD-10-CM

## 2023-09-29 DIAGNOSIS — I10 BENIGN ESSENTIAL HYPERTENSION: ICD-10-CM

## 2023-09-29 DIAGNOSIS — S81.801D WOUND OF RIGHT LOWER EXTREMITY, SUBSEQUENT ENCOUNTER: ICD-10-CM

## 2023-09-29 DIAGNOSIS — K21.9 GERD WITHOUT ESOPHAGITIS: ICD-10-CM

## 2023-09-29 DIAGNOSIS — N18.4 ANEMIA DUE TO STAGE 4 CHRONIC KIDNEY DISEASE (MULTI): ICD-10-CM

## 2023-09-29 DIAGNOSIS — D63.1 ANEMIA DUE TO STAGE 4 CHRONIC KIDNEY DISEASE (MULTI): ICD-10-CM

## 2023-09-29 PROCEDURE — 99308 SBSQ NF CARE LOW MDM 20: CPT | Performed by: INTERNAL MEDICINE

## 2023-09-29 NOTE — LETTER
Patient: Rocio Mendieta  : 1927    Encounter Date: 2023    Name: Rocio Mendieta  YOB: 1927    FOLLOW UP VISIT:   Allergies: Egg, Fish   Code Status: DNR-CC    Special Instructions: Skilled services for wounds, PT/OT care plan development and nursing observation and assessment. Document wounds, monitor for infection. Monitor VS and lab work as indicated.   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 STAGE 4 CHRONIC KIDNEY DISEASE, OR UNSPECIFIED CHRONIC KIDNEY DISEASE   SUBJECTIVE:  89-year-old patient DNR CC evaluated for kidney disease anemia skin irritation wound care evaluation advise regular house diet thin liquid consistency ammonium lactate cream application left lower calf wound care management iron B12 folic acid supplement mild dehydration with renal insufficiency increase fluid intake all nephrotoxic medication        WBC 8.16 H&H 11.3 and 36.6 BUN of 42 creatinine 1.9 hemoglobin A1c 5.4    Anemia with chronic kidney disease    REVIEW OF SYSTEMS:   All review of systems are negative unless otherwise stated above under subjective.    LABS REVIEWED AT FACILITY:  Review  MEDICATIONS REVIEWED AT FACILITY:   Order Order Status Revised Last Ordered    Ferrous Sulfate Oral Tablet Delayed Release 324 (65 Fe) MG (Ferrous Sulfate) Active 2023     Sennosides-Docusate Sodium Oral Tablet 8.6-50 MG (Sennosides-Docusate Sodium) Active 2023     Fluticasone Propionate Nasal Suspension 50 MCG/ACT (Fluticasone Propionate (Nasal)) Active 2023   This order is potentially duplicated by other orders on the chart. Click to view details.    Acetaminophen Oral Tablet 325 MG (Acetaminophen) Active 2/15/2023     Melatonin Oral Tablet 3 MG (Melatonin) Active 1/3/2023     Cholecalciferol Oral Tablet 25 MCG (1000 UT) (Cholecalciferol) Active 2023   This order is potentially duplicated by other orders on the  chart. Click to view details.    Tylenol Extra Strength Oral Tablet 500 MG (Acetaminophen) Active 1/26/2023 1/26/2023    Saccharomyces boulardii Capsule 250 MG Active 3/16/2023 3/15/2023    Dicyclomine HCl Tablet 20 MG  Living will related issues reviewed-Code status:     OBJECTIVE: ICD-10 Short Description  1/3/2023    I12.9 HYPERTENSIVE CHRONIC KIDNEY DISEASE W STG 1-4/UNSP CHR KDNY  1/3/2023    I35.0 NONRHEUMATIC AORTIC (VALVE) STENOSIS  1/3/2023    I73.9 PERIPHERAL VASCULAR DISEASE, UNSPECIFIED  1/3/2023    D50.9 IRON DEFICIENCY ANEMIA, UNSPECIFIED  1/3/2023    K21.9 GASTRO-ESOPHAGEAL REFLUX DISEASE WITHOUT ESOPHAGITIS  1/3/2023    S91.301D UNSPECIFIED OPEN WOUND, RIGHT FOOT, SUBSEQUENT ENCOUNTER  1/3/2023    R62.7 ADULT FAILURE TO THRIVE  1/3/2023    M62.522 MUSCLE WASTING AND ATROPHY, NEC, LEFT UPPER ARM  1/18/2023    L08.9 LOCAL INFECTION OF THE SKIN AND SUBCUTANEOUS TISSUE, UNSP  1/3/2023    Z74.1 NEED FOR ASSISTANCE WITH PERSONAL CARE  1/3/2023    R29.6 REPEATED FALLS  1/3/2023    R26.2 DIFFICULTY IN WALKING, NOT ELSEWHERE CLASSIFIED  1/3/2023    M62.81 MUSCLE WEAKNESS (GENERALIZED  1/3/2023    N18.4 CHRONIC KIDNEY DISEASE, STAGE 4 (SEVERE  There were no vitals taken for this visit.  Physical Exam  Blood pressure 118/77 heart rate 72 respiratory 14 temperature 98 oxygen saturation 97%  Appearance: Alert, oriented , cooperative,  in no acute distress. Well nourished & well hydrated.    Skin: Pressure ulcer stage second and third left leg calf pressure ulcer    Eyes: PERRLA, EOMs intact,  Conjunctiva pink with no redness or exudates. Cornea & anterior chamber are clear, Eyelids without lesions. No scleral icterus.     ENT: Minimal wax  Neck: JVD plus  Pulmonary: Crackles rales rhonchi  Cardiac: Heart murmur  Abdomen: Soft, nontender, active bowel sounds.  No palpable organomegaly.  No rebound or guarding.  No CVA tenderness.    Genitourinary: Exam deferred.    Musculoskeletal: Hip pain back pain  tenderness  Neurological: Anxiety depression dementia    Psychiatric: Appropriate mood and affect.     Assessment/Plan  Problem List Items Addressed This Visit       GERD without esophagitis    Benign essential hypertension     Patients BP readings reviewed and addressed, as we age our arteries turn stiffer and less elastic. Restricting salt consumption and staying physically fit with regular exercise regimen is the only way to keep our vasculature less tonic. Studies have shown that keeping ideal body wt, exercise routine about 140 to 150 minutes a week, eating variety of plant based diet and drinking plentiful water are quite helpful. Monitor BP twice or once a week at home and bring log to be reviewed by me. Uncontrolled BP has long term consequences including heart failure, myocardial infarction, accelerated atherosclerosis and kidney dysfunction. Therapy reviewed and explained.           CKD (chronic kidney disease), stage IV (CMS/HCC) - Primary     CKD and various stages of CKD and significance explained, CKD is very common and rising diagnosis among [place] adults. Main culprits for CKD etiology needs to be attended well. GFR values explained. Nephrotoxic agents has to be avoided, plenty of water consumption is always beneficial. Avoid NSAIDs, always check with MD about usage of OTC medications or any other Rx given by other providers. GFR less then 10 usually will need renal replacement therapy. Episodic check on renal functions needed. Always ask MD about GFR at next visit. Avoid dehydration.           Wound of right leg       Wound care evaluation nutrition evaluation infectious disease evaluation        This patient was seen for my regular monthly visit, nursing evaluations and nursing notes were reviewed, interim events are reviewed, interim concerns and messages were reviewed as we have communicated with nursing staff.  Any issues with the falls, skin care impairment, declining physical condition are  reviewed and noted, diagnosis list were reviewed, list of medications were reviewed, living will related issues were reviewed, overall patient has been doing well, any declining in patient's condition or any change in patient's condition needs to be notified to physician promptly, discussed with nursing staff, if needed would communicate with family.  Patient stays confined here at the facility for long-term care, there are always concerns about long-term care related issues and concerns.  Nursing staff is trying their best to keep patient safe, all sort of measures has been taken to keep patient safe and comfortable.  Consultation taken to the wound care evaluation dietitian evaluation check the CBC BMP TSH protein level  Skin integrity:  Nursing to monitor skin integrity as patient is at risk for pressure injuries.  Wound care per nursing    See Facility notes for measurements/assessments  Turn and reposition Q 2 hours or more  Air mattress and when up in chair cushion reducing device  Dietician to evaluate and recommend:  Nutritional supplement:  Please monitor skin integrity and other pressure areas  Referral to wound clinic if appropriate:    PLAN:  Pt has been seen for follow up visit.  The patient is here at facility for PT/OT/ST to maximize strength, function, endurance and safety.  The patient is participating in therapy. Rocio Mendieta is making progress to the best of his/her ability.   Please see PT/OT/ST notes in the facility for detailed information regarding progression of patients progress.   Recent nursing evaluation and notes were reviewed.   Overall, patient is stable despite his/her chronic conditions.    #  Any decline or change in condition needs to be communicated with the physician or myself.    Discussion with nursing staff regarding ongoing care and management.  If needed, would communicate with family who are not present at this time.   There are no concerns at this time.  We will continue  with the medications noted above.    We will continue to follow the patient here at the facility.    Discharge planning:   Discharge Home when goals are met.   Follow up with PCP in 1-2 weeks after discharge from facility.   C to follow after discharge for continued PT/OT and Nursing.    *Please note that nursing facility, outside laboratory agency, and  [facility] do not interface.     Completion of the note was done through Dragon voice recognition technology and may include   unintended or grammatical errors which may not have been recognized when finalizing the note.     Time:    Hari Galvan MD         Electronically Signed By: Hari Galvan MD   11/5/23  5:17 PM

## 2023-11-04 ENCOUNTER — NURSING HOME VISIT (OUTPATIENT)
Dept: POST ACUTE CARE | Facility: EXTERNAL LOCATION | Age: 88
End: 2023-11-04
Payer: MEDICARE

## 2023-11-04 DIAGNOSIS — N18.4 CKD (CHRONIC KIDNEY DISEASE), STAGE IV (MULTI): ICD-10-CM

## 2023-11-04 DIAGNOSIS — K21.9 GERD WITHOUT ESOPHAGITIS: ICD-10-CM

## 2023-11-04 DIAGNOSIS — I10 BENIGN ESSENTIAL HYPERTENSION: ICD-10-CM

## 2023-11-04 DIAGNOSIS — D63.1 ANEMIA DUE TO STAGE 4 CHRONIC KIDNEY DISEASE (MULTI): ICD-10-CM

## 2023-11-04 DIAGNOSIS — R63.4 WEIGHT LOSS, NON-INTENTIONAL: ICD-10-CM

## 2023-11-04 DIAGNOSIS — N18.4 ANEMIA DUE TO STAGE 4 CHRONIC KIDNEY DISEASE (MULTI): ICD-10-CM

## 2023-11-04 DIAGNOSIS — J00 COMMON COLD: Primary | ICD-10-CM

## 2023-11-04 PROCEDURE — 99308 SBSQ NF CARE LOW MDM 20: CPT | Performed by: INTERNAL MEDICINE

## 2023-11-04 NOTE — LETTER
Patient: Rocio Mendieta  : 1927    Encounter Date: 2023    Name: Rocio Mendieta  YOB: 1927    FOLLOW UP VISIT:   Allergies: Egg, Fish   Code Status: DNR-CC    Special Instructions: Skilled services for wounds, PT/OT care plan development and nursing observation and assessment. Document wounds, monitor for infection. Monitor VS and lab work as indicated.   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 STAGE 4 CHRONIC KIDNEY DISEASE, OR UNSPECIFIED CHRONIC KIDNEY DISEASE   SUBJECTIVE:  Anemia cough congestion  Runny nose sinus headache  Advised to check the COVID test  89-year-old patient DNR CC evaluated for kidney disease anemia skin irritation wound care evaluation advise regular house diet thin liquid consistency ammonium lactate cream application left lower calf wound care management iron B12 folic acid supplement mild dehydration with renal insufficiency increase fluid intake all nephrotoxic medication      WBC 6.79 H&H 10.8 and 35.1 BUN 44 creatinine 1.8 normal thyroid hemoglobin A1c 5.7    Anemia with chronic kidney disease    REVIEW OF SYSTEMS:   All review of systems are negative unless otherwise stated above under subjective.    LABS REVIEWED AT FACILITY:  Review  MEDICATIONS REVIEWED AT FACILITY:   Order Order Status Revised Last Ordered    Ferrous Sulfate Oral Tablet Delayed Release 324 (65 Fe) MG (Ferrous Sulfate) Active 2023     Sennosides-Docusate Sodium Oral Tablet 8.6-50 MG (Sennosides-Docusate Sodium) Active 2023     Fluticasone Propionate Nasal Suspension 50 MCG/ACT (Fluticasone Propionate (Nasal)) Active 2023   This order is potentially duplicated by other orders on the chart. Click to view details.    Acetaminophen Oral Tablet 325 MG (Acetaminophen) Active 2/15/2023     Melatonin Oral Tablet 3 MG (Melatonin) Active 1/3/2023     Cholecalciferol Oral Tablet 25 MCG (1000 UT)  (Cholecalciferol) Active 1/4/2023   This order is potentially duplicated by other orders on the chart. Click to view details.    Tylenol Extra Strength Oral Tablet 500 MG (Acetaminophen) Active 1/26/2023 1/26/2023    Saccharomyces boulardii Capsule 250 MG Active 3/16/2023 3/15/2023    Dicyclomine HCl Tablet 20 MG  Living will related issues reviewed-Code status:     OBJECTIVE: ICD-10 Short Description  1/3/2023    I12.9 HYPERTENSIVE CHRONIC KIDNEY DISEASE W STG 1-4/UNSP CHR KDNY  1/3/2023    I35.0 NONRHEUMATIC AORTIC (VALVE) STENOSIS  1/3/2023    I73.9 PERIPHERAL VASCULAR DISEASE, UNSPECIFIED  1/3/2023    D50.9 IRON DEFICIENCY ANEMIA, UNSPECIFIED  1/3/2023    K21.9 GASTRO-ESOPHAGEAL REFLUX DISEASE WITHOUT ESOPHAGITIS  1/3/2023    S91.301D UNSPECIFIED OPEN WOUND, RIGHT FOOT, SUBSEQUENT ENCOUNTER  1/3/2023    R62.7 ADULT FAILURE TO THRIVE  1/3/2023    M62.522 MUSCLE WASTING AND ATROPHY, NEC, LEFT UPPER ARM  1/18/2023    L08.9 LOCAL INFECTION OF THE SKIN AND SUBCUTANEOUS TISSUE, UNSP  1/3/2023    Z74.1 NEED FOR ASSISTANCE WITH PERSONAL CARE  1/3/2023    R29.6 REPEATED FALLS  1/3/2023    R26.2 DIFFICULTY IN WALKING, NOT ELSEWHERE CLASSIFIED  1/3/2023    M62.81 MUSCLE WEAKNESS (GENERALIZED  1/3/2023    N18.4 CHRONIC KIDNEY DISEASE, STAGE 4 (SEVERE  There were no vitals taken for this visit.  Physical Exam  Blood pressure 118/77 heart rate 72 respiratory 14 temperature 98 oxygen saturation 97%  Appearance: Alert, oriented , cooperative,  in no acute distress. Well nourished & well hydrated.    Skin: Pressure ulcer stage second and third left leg calf pressure ulcer    Eyes: PERRLA, EOMs intact,  Conjunctiva pink with no redness or exudates. Cornea & anterior chamber are clear, Eyelids without lesions. No scleral icterus.     ENT: Minimal wax  Neck: JVD plus  Pulmonary: Crackles rales rhonchi  Cardiac: Heart murmur  Abdomen: Soft, nontender, active bowel sounds.  No palpable organomegaly.  No rebound or guarding.  No CVA  tenderness.    Genitourinary: Exam deferred.    Musculoskeletal: Hip pain back pain tenderness  Neurological: Anxiety depression dementia    Psychiatric: Appropriate mood and affect.     Assessment/Plan  Problem List Items Addressed This Visit       Weight loss, non-intentional    GERD without esophagitis    Benign essential hypertension    CKD (chronic kidney disease), stage IV (CMS/[facility])    Common cold - Primary     Other Visit Diagnoses       Anemia due to stage 4 chronic kidney disease (CMS/[facility])            This patient was seen for my regular monthly visit, nursing evaluations and nursing notes were reviewed, interim events are reviewed, interim concerns and messages were reviewed as we have communicated with nursing staff.  Any issues with the falls, skin care impairment, declining physical condition are reviewed and noted, diagnosis list were reviewed, list of medications were reviewed, living will related issues were reviewed, overall patient has been doing well, any declining in patient's condition or any change in patient's condition needs to be notified to physician promptly, discussed with nursing staff, if needed would communicate with family.  Patient stays confined here at the facility for long-term care, there are always concerns about long-term care related issues and concerns.  Nursing staff is trying their best to keep patient safe, all sort of measures has been taken to keep patient safe and comfortable.    Reviewed laboratory  Iron B12 folic acid supplement  Farxiga  Vitamin C  Advised to check COVID test and ultrasound of kidney    Consultation taken to the wound care evaluation dietitian evaluation check the CBC BMP TSH protein level  Skin integrity:  Nursing to monitor skin integrity as patient is at risk for pressure injuries.  Wound care per nursing    See Facility notes for measurements/assessments  Turn and reposition Q 2 hours or more  Air mattress and when up in chair cushion reducing  device  Dietician to evaluate and recommend:  Nutritional supplement:  Please monitor skin integrity and other pressure areas  Referral to wound clinic if appropriate:    PLAN:  Pt has been seen for follow up visit.  The patient is here at facility for PT/OT/ST to maximize strength, function, endurance and safety.  The patient is participating in therapy. Rocio Mendieta is making progress to the best of his/her ability.   Please see PT/OT/ST notes in the facility for detailed information regarding progression of patients progress.   Recent nursing evaluation and notes were reviewed.   Overall, patient is stable despite his/her chronic conditions.    #  Any decline or change in condition needs to be communicated with the physician or myself.    Discussion with nursing staff regarding ongoing care and management.  If needed, would communicate with family who are not present at this time.   There are no concerns at this time.  We will continue with the medications noted above.    We will continue to follow the patient here at the facility.    Discharge planning:   Discharge Home when goals are met.   Follow up with PCP in 1-2 weeks after discharge from facility.   C to follow after discharge for continued PT/OT and Nursing.    *Please note that nursing facility, outside laboratory agency, and [facility] do not interface.     Completion of the note was done through Dragon voice recognition technology and may include   unintended or grammatical errors which may not have been recognized when finalizing the note.     Time:    Hari Galvan MD         Electronically Signed By: Hari Gavlan MD   11/5/23  5:20 PM

## 2023-11-05 PROBLEM — J00 COMMON COLD: Status: ACTIVE | Noted: 2023-11-05

## 2023-11-05 NOTE — PROGRESS NOTES
Name: Rocio Mendieta  YOB: 1927    FOLLOW UP VISIT:   Allergies: Egg, Fish   Code Status: DNR-CC    Special Instructions: Skilled services for wounds, PT/OT care plan development and nursing observation and assessment. Document wounds, monitor for infection. Monitor VS and lab work as indicated.   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 STAGE 4 CHRONIC KIDNEY DISEASE, OR UNSPECIFIED CHRONIC KIDNEY DISEASE   SUBJECTIVE:  89-year-old patient DNR CC evaluated for kidney disease anemia skin irritation wound care evaluation advise regular house diet thin liquid consistency ammonium lactate cream application left lower calf wound care management iron B12 folic acid supplement mild dehydration with renal insufficiency increase fluid intake all nephrotoxic medication        WBC 8.16 H&H 11.3 and 36.6 BUN of 42 creatinine 1.9 hemoglobin A1c 5.4    Anemia with chronic kidney disease    REVIEW OF SYSTEMS:   All review of systems are negative unless otherwise stated above under subjective.    LABS REVIEWED AT FACILITY:  Review  MEDICATIONS REVIEWED AT FACILITY:   Order Order Status Revised Last Ordered    Ferrous Sulfate Oral Tablet Delayed Release 324 (65 Fe) MG (Ferrous Sulfate) Active 1/4/2023     Sennosides-Docusate Sodium Oral Tablet 8.6-50 MG (Sennosides-Docusate Sodium) Active 1/25/2023     Fluticasone Propionate Nasal Suspension 50 MCG/ACT (Fluticasone Propionate (Nasal)) Active 1/4/2023   This order is potentially duplicated by other orders on the chart. Click to view details.    Acetaminophen Oral Tablet 325 MG (Acetaminophen) Active 2/15/2023     Melatonin Oral Tablet 3 MG (Melatonin) Active 1/3/2023     Cholecalciferol Oral Tablet 25 MCG (1000 UT) (Cholecalciferol) Active 1/4/2023   This order is potentially duplicated by other orders on the chart. Click to view details.    Tylenol Extra Strength Oral Tablet 500  MG (Acetaminophen) Active 1/26/2023 1/26/2023    Saccharomyces boulardii Capsule 250 MG Active 3/16/2023 3/15/2023    Dicyclomine HCl Tablet 20 MG  Living will related issues reviewed-Code status:     OBJECTIVE: ICD-10 Short Description  1/3/2023    I12.9 HYPERTENSIVE CHRONIC KIDNEY DISEASE W STG 1-4/UNSP CHR KDNY  1/3/2023    I35.0 NONRHEUMATIC AORTIC (VALVE) STENOSIS  1/3/2023    I73.9 PERIPHERAL VASCULAR DISEASE, UNSPECIFIED  1/3/2023    D50.9 IRON DEFICIENCY ANEMIA, UNSPECIFIED  1/3/2023    K21.9 GASTRO-ESOPHAGEAL REFLUX DISEASE WITHOUT ESOPHAGITIS  1/3/2023    S91.301D UNSPECIFIED OPEN WOUND, RIGHT FOOT, SUBSEQUENT ENCOUNTER  1/3/2023    R62.7 ADULT FAILURE TO THRIVE  1/3/2023    M62.522 MUSCLE WASTING AND ATROPHY, NEC, LEFT UPPER ARM  1/18/2023    L08.9 LOCAL INFECTION OF THE SKIN AND SUBCUTANEOUS TISSUE, UNSP  1/3/2023    Z74.1 NEED FOR ASSISTANCE WITH PERSONAL CARE  1/3/2023    R29.6 REPEATED FALLS  1/3/2023    R26.2 DIFFICULTY IN WALKING, NOT ELSEWHERE CLASSIFIED  1/3/2023    M62.81 MUSCLE WEAKNESS (GENERALIZED  1/3/2023    N18.4 CHRONIC KIDNEY DISEASE, STAGE 4 (SEVERE  There were no vitals taken for this visit.  Physical Exam  Blood pressure 118/77 heart rate 72 respiratory 14 temperature 98 oxygen saturation 97%  Appearance: Alert, oriented , cooperative,  in no acute distress. Well nourished & well hydrated.    Skin: Pressure ulcer stage second and third left leg calf pressure ulcer    Eyes: PERRLA, EOMs intact,  Conjunctiva pink with no redness or exudates. Cornea & anterior chamber are clear, Eyelids without lesions. No scleral icterus.     ENT: Minimal wax  Neck: JVD plus  Pulmonary: Crackles rales rhonchi  Cardiac: Heart murmur  Abdomen: Soft, nontender, active bowel sounds.  No palpable organomegaly.  No rebound or guarding.  No CVA tenderness.    Genitourinary: Exam deferred.    Musculoskeletal: Hip pain back pain tenderness  Neurological: Anxiety depression dementia    Psychiatric: Appropriate  mood and affect.     Assessment/Plan   Problem List Items Addressed This Visit       GERD without esophagitis    Benign essential hypertension     Patients BP readings reviewed and addressed, as we age our arteries turn stiffer and less elastic. Restricting salt consumption and staying physically fit with regular exercise regimen is the only way to keep our vasculature less tonic. Studies have shown that keeping ideal body wt, exercise routine about 140 to 150 minutes a week, eating variety of plant based diet and drinking plentiful water are quite helpful. Monitor BP twice or once a week at home and bring log to be reviewed by me. Uncontrolled BP has long term consequences including heart failure, myocardial infarction, accelerated atherosclerosis and kidney dysfunction. Therapy reviewed and explained.           CKD (chronic kidney disease), stage IV (CMS/[facility]) - Primary     CKD and various stages of CKD and significance explained, CKD is very common and rising diagnosis among [place] adults. Main culprits for CKD etiology needs to be attended well. GFR values explained. Nephrotoxic agents has to be avoided, plenty of water consumption is always beneficial. Avoid NSAIDs, always check with MD about usage of OTC medications or any other Rx given by other providers. GFR less then 10 usually will need renal replacement therapy. Episodic check on renal functions needed. Always ask MD about GFR at next visit. Avoid dehydration.           Wound of right leg       Wound care evaluation nutrition evaluation infectious disease evaluation        This patient was seen for my regular monthly visit, nursing evaluations and nursing notes were reviewed, interim events are reviewed, interim concerns and messages were reviewed as we have communicated with nursing staff.  Any issues with the falls, skin care impairment, declining physical condition are reviewed and noted, diagnosis list were reviewed, list of medications were reviewed,  living will related issues were reviewed, overall patient has been doing well, any declining in patient's condition or any change in patient's condition needs to be notified to physician promptly, discussed with nursing staff, if needed would communicate with family.  Patient stays confined here at the facility for long-term care, there are always concerns about long-term care related issues and concerns.  Nursing staff is trying their best to keep patient safe, all sort of measures has been taken to keep patient safe and comfortable.  Consultation taken to the wound care evaluation dietitian evaluation check the CBC BMP TSH protein level  Skin integrity:  Nursing to monitor skin integrity as patient is at risk for pressure injuries.  Wound care per nursing    See Facility notes for measurements/assessments  Turn and reposition Q 2 hours or more  Air mattress and when up in chair cushion reducing device  Dietician to evaluate and recommend:  Nutritional supplement:  Please monitor skin integrity and other pressure areas  Referral to wound clinic if appropriate:    PLAN:  Pt has been seen for follow up visit.  The patient is here at facility for PT/OT/ST to maximize strength, function, endurance and safety.  The patient is participating in therapy. Rocio Mendieta is making progress to the best of his/her ability.   Please see PT/OT/ST notes in the facility for detailed information regarding progression of patients progress.   Recent nursing evaluation and notes were reviewed.   Overall, patient is stable despite his/her chronic conditions.    #  Any decline or change in condition needs to be communicated with the physician or myself.    Discussion with nursing staff regarding ongoing care and management.  If needed, would communicate with family who are not present at this time.   There are no concerns at this time.  We will continue with the medications noted above.    We will continue to follow the patient here at  the facility.    Discharge planning:   Discharge Home when goals are met.   Follow up with PCP in 1-2 weeks after discharge from facility.   [facility] to follow after discharge for continued PT/OT and Nursing.    *Please note that nursing facility, outside laboratory agency, and  [facility] do not interface.     Completion of the note was done through Dragon voice recognition technology and may include   unintended or grammatical errors which may not have been recognized when finalizing the note.     Time:    Hari Galvan MD

## 2023-11-05 NOTE — PROGRESS NOTES
Name: Rocio Mendieta  YOB: 1927    FOLLOW UP VISIT:   Allergies: Egg, Fish   Code Status: DNR-CC    Special Instructions: Skilled services for wounds, PT/OT care plan development and nursing observation and assessment. Document wounds, monitor for infection. Monitor VS and lab work as indicated.   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 STAGE 4 CHRONIC KIDNEY DISEASE, OR UNSPECIFIED CHRONIC KIDNEY DISEASE   SUBJECTIVE:  Anemia cough congestion  Runny nose sinus headache  Advised to check the COVID test  89-year-old patient DNR CC evaluated for kidney disease anemia skin irritation wound care evaluation advise regular house diet thin liquid consistency ammonium lactate cream application left lower calf wound care management iron B12 folic acid supplement mild dehydration with renal insufficiency increase fluid intake all nephrotoxic medication      WBC 6.79 H&H 10.8 and 35.1 BUN 44 creatinine 1.8 normal thyroid hemoglobin A1c 5.7    Anemia with chronic kidney disease    REVIEW OF SYSTEMS:   All review of systems are negative unless otherwise stated above under subjective.    LABS REVIEWED AT FACILITY:  Review  MEDICATIONS REVIEWED AT FACILITY:   Order Order Status Revised Last Ordered    Ferrous Sulfate Oral Tablet Delayed Release 324 (65 Fe) MG (Ferrous Sulfate) Active 1/4/2023     Sennosides-Docusate Sodium Oral Tablet 8.6-50 MG (Sennosides-Docusate Sodium) Active 1/25/2023     Fluticasone Propionate Nasal Suspension 50 MCG/ACT (Fluticasone Propionate (Nasal)) Active 1/4/2023   This order is potentially duplicated by other orders on the chart. Click to view details.    Acetaminophen Oral Tablet 325 MG (Acetaminophen) Active 2/15/2023     Melatonin Oral Tablet 3 MG (Melatonin) Active 1/3/2023     Cholecalciferol Oral Tablet 25 MCG (1000 UT) (Cholecalciferol) Active 1/4/2023   This order is potentially duplicated  by other orders on the chart. Click to view details.    Tylenol Extra Strength Oral Tablet 500 MG (Acetaminophen) Active 1/26/2023 1/26/2023    Saccharomyces boulardii Capsule 250 MG Active 3/16/2023 3/15/2023    Dicyclomine HCl Tablet 20 MG  Living will related issues reviewed-Code status:     OBJECTIVE: ICD-10 Short Description  1/3/2023    I12.9 HYPERTENSIVE CHRONIC KIDNEY DISEASE W STG 1-4/UNSP CHR KDNY  1/3/2023    I35.0 NONRHEUMATIC AORTIC (VALVE) STENOSIS  1/3/2023    I73.9 PERIPHERAL VASCULAR DISEASE, UNSPECIFIED  1/3/2023    D50.9 IRON DEFICIENCY ANEMIA, UNSPECIFIED  1/3/2023    K21.9 GASTRO-ESOPHAGEAL REFLUX DISEASE WITHOUT ESOPHAGITIS  1/3/2023    S91.301D UNSPECIFIED OPEN WOUND, RIGHT FOOT, SUBSEQUENT ENCOUNTER  1/3/2023    R62.7 ADULT FAILURE TO THRIVE  1/3/2023    M62.522 MUSCLE WASTING AND ATROPHY, NEC, LEFT UPPER ARM  1/18/2023    L08.9 LOCAL INFECTION OF THE SKIN AND SUBCUTANEOUS TISSUE, UNSP  1/3/2023    Z74.1 NEED FOR ASSISTANCE WITH PERSONAL CARE  1/3/2023    R29.6 REPEATED FALLS  1/3/2023    R26.2 DIFFICULTY IN WALKING, NOT ELSEWHERE CLASSIFIED  1/3/2023    M62.81 MUSCLE WEAKNESS (GENERALIZED  1/3/2023    N18.4 CHRONIC KIDNEY DISEASE, STAGE 4 (SEVERE  There were no vitals taken for this visit.  Physical Exam  Blood pressure 118/77 heart rate 72 respiratory 14 temperature 98 oxygen saturation 97%  Appearance: Alert, oriented , cooperative,  in no acute distress. Well nourished & well hydrated.    Skin: Pressure ulcer stage second and third left leg calf pressure ulcer    Eyes: PERRLA, EOMs intact,  Conjunctiva pink with no redness or exudates. Cornea & anterior chamber are clear, Eyelids without lesions. No scleral icterus.     ENT: Minimal wax  Neck: JVD plus  Pulmonary: Crackles rales rhonchi  Cardiac: Heart murmur  Abdomen: Soft, nontender, active bowel sounds.  No palpable organomegaly.  No rebound or guarding.  No CVA tenderness.    Genitourinary: Exam deferred.    Musculoskeletal: Hip pain  back pain tenderness  Neurological: Anxiety depression dementia    Psychiatric: Appropriate mood and affect.     Assessment/Plan   Problem List Items Addressed This Visit       Weight loss, non-intentional    GERD without esophagitis    Benign essential hypertension    CKD (chronic kidney disease), stage IV (CMS/[facility])    Common cold - Primary     Other Visit Diagnoses       Anemia due to stage 4 chronic kidney disease (CMS/[facility])            This patient was seen for my regular monthly visit, nursing evaluations and nursing notes were reviewed, interim events are reviewed, interim concerns and messages were reviewed as we have communicated with nursing staff.  Any issues with the falls, skin care impairment, declining physical condition are reviewed and noted, diagnosis list were reviewed, list of medications were reviewed, living will related issues were reviewed, overall patient has been doing well, any declining in patient's condition or any change in patient's condition needs to be notified to physician promptly, discussed with nursing staff, if needed would communicate with family.  Patient stays confined here at the facility for long-term care, there are always concerns about long-term care related issues and concerns.  Nursing staff is trying their best to keep patient safe, all sort of measures has been taken to keep patient safe and comfortable.    Reviewed laboratory  Iron B12 folic acid supplement  Farxiga  Vitamin C  Advised to check COVID test and ultrasound of kidney    Consultation taken to the wound care evaluation dietitian evaluation check the CBC BMP TSH protein level  Skin integrity:  Nursing to monitor skin integrity as patient is at risk for pressure injuries.  Wound care per nursing    See Facility notes for measurements/assessments  Turn and reposition Q 2 hours or more  Air mattress and when up in chair cushion reducing device  Dietician to evaluate and recommend:  Nutritional  supplement:  Please monitor skin integrity and other pressure areas  Referral to wound clinic if appropriate:    PLAN:  Pt has been seen for follow up visit.  The patient is here at facility for PT/OT/ST to maximize strength, function, endurance and safety.  The patient is participating in therapy. Rocio Mendieta is making progress to the best of his/her ability.   Please see PT/OT/ST notes in the facility for detailed information regarding progression of patients progress.   Recent nursing evaluation and notes were reviewed.   Overall, patient is stable despite his/her chronic conditions.    #  Any decline or change in condition needs to be communicated with the physician or myself.    Discussion with nursing staff regarding ongoing care and management.  If needed, would communicate with family who are not present at this time.   There are no concerns at this time.  We will continue with the medications noted above.    We will continue to follow the patient here at the facility.    Discharge planning:   Discharge Home when goals are met.   Follow up with PCP in 1-2 weeks after discharge from facility.   C to follow after discharge for continued PT/OT and Nursing.    *Please note that nursing facility, outside laboratory agency, and [facility] do not interface.     Completion of the note was done through Dragon voice recognition technology and may include   unintended or grammatical errors which may not have been recognized when finalizing the note.     Time:    Hari Galvan MD

## 2024-01-10 ENCOUNTER — NURSING HOME VISIT (OUTPATIENT)
Dept: POST ACUTE CARE | Facility: EXTERNAL LOCATION | Age: 89
End: 2024-01-10
Payer: COMMERCIAL

## 2024-01-10 DIAGNOSIS — N18.4 CKD (CHRONIC KIDNEY DISEASE), STAGE IV (MULTI): ICD-10-CM

## 2024-01-10 DIAGNOSIS — R73.9 HYPERGLYCEMIA: ICD-10-CM

## 2024-01-10 DIAGNOSIS — J30.9 ALLERGIC SINUSITIS: ICD-10-CM

## 2024-01-10 DIAGNOSIS — Z12.31 ENCOUNTER FOR SCREENING MAMMOGRAM FOR MALIGNANT NEOPLASM OF BREAST: ICD-10-CM

## 2024-01-10 DIAGNOSIS — D63.1 ANEMIA DUE TO STAGE 4 CHRONIC KIDNEY DISEASE (MULTI): ICD-10-CM

## 2024-01-10 DIAGNOSIS — Z78.0 ENCOUNTER FOR OSTEOPOROSIS SCREENING IN ASYMPTOMATIC POSTMENOPAUSAL PATIENT: ICD-10-CM

## 2024-01-10 DIAGNOSIS — K21.9 GERD WITHOUT ESOPHAGITIS: ICD-10-CM

## 2024-01-10 DIAGNOSIS — Z13.820 ENCOUNTER FOR OSTEOPOROSIS SCREENING IN ASYMPTOMATIC POSTMENOPAUSAL PATIENT: ICD-10-CM

## 2024-01-10 DIAGNOSIS — N18.4 ANEMIA DUE TO STAGE 4 CHRONIC KIDNEY DISEASE (MULTI): ICD-10-CM

## 2024-01-10 DIAGNOSIS — H10.13 ALLERGIC CONJUNCTIVITIS OF BOTH EYES: Primary | ICD-10-CM

## 2024-01-10 DIAGNOSIS — Z13.820 SCREENING FOR OSTEOPOROSIS: ICD-10-CM

## 2024-01-10 DIAGNOSIS — E06.3 HASHIMOTO'S DISEASE: ICD-10-CM

## 2024-01-10 PROCEDURE — 99309 SBSQ NF CARE MODERATE MDM 30: CPT | Performed by: INTERNAL MEDICINE

## 2024-01-10 NOTE — LETTER
Patient: Rocio Mendieta  : 1927    Encounter Date: 01/10/2024    Name: Rocio Mendieta  YOB: 1927    FOLLOW UP VISIT:   Allergies: Egg, Fish   Code Status: DNR-CC    Special Instructions: Skilled services for wounds, PT/OT care plan development and nursing observation and assessment. Document wounds, monitor for infection. Monitor VS and lab work as indicated.   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 STAGE 4 CHRONIC KIDNEY DISEASE, OR UNSPECIFIED CHRONIC KIDNEY DISEASE allergic rhinitis conjunctivitis  SUBJECTIVE:  89-year-old patient DNR CC evaluated for hypertension hyperlipidemia.  Patient sinus ear throat eyes advised artificial ophthalmic teardrops glycol Flonase Claritin normal and nasal spray  89-year-old patient DNR CC evaluated for kidney disease anemia skin irritation wound care evaluation advise regular house diet thin liquid consistency ammonium lactate cream application left lower calf wound care management iron B12 folic acid supplement mild dehydration with renal insufficiency increase fluid intake all nephrotoxic medication      WBC 6.79 H&H 10.8 and 35.1 BUN 44 creatinine 1.8 normal thyroid hemoglobin A1c 5.7    Anemia with chronic kidney disease    REVIEW OF SYSTEMS:   All review of systems are negative unless otherwise stated above under subjective.    LABS REVIEWED AT FACILITY:  Review  MEDICATIONS REVIEWED AT FACILITY:   Order Order Status Revised Last Ordered    Ferrous Sulfate Oral Tablet Delayed Release 324 (65 Fe) MG (Ferrous Sulfate) Active 2023     Sennosides-Docusate Sodium Oral Tablet 8.6-50 MG (Sennosides-Docusate Sodium) Active 2023     Fluticasone Propionate Nasal Suspension 50 MCG/ACT (Fluticasone Propionate (Nasal)) Active 2023   This order is potentially duplicated by other orders on the chart. Click to view details.    Acetaminophen Oral Tablet 325 MG  (Acetaminophen) Active 2/15/2023     Melatonin Oral Tablet 3 MG (Melatonin) Active 1/3/2023     Cholecalciferol Oral Tablet 25 MCG (1000 UT) (Cholecalciferol) Active 1/4/2023   This order is potentially duplicated by other orders on the chart. Click to view details.    Tylenol Extra Strength Oral Tablet 500 MG (Acetaminophen) Active 1/26/2023 1/26/2023    Saccharomyces boulardii Capsule 250 MG Active 3/16/2023 3/15/2023    Dicyclomine HCl Tablet 20 MG  Living will related issues reviewed-Code status:     OBJECTIVE: ICD-10 Short Description  1/3/2023    I12.9 HYPERTENSIVE CHRONIC KIDNEY DISEASE W STG 1-4/UNSP CHR KDNY  1/3/2023    I35.0 NONRHEUMATIC AORTIC (VALVE) STENOSIS  1/3/2023    I73.9 PERIPHERAL VASCULAR DISEASE, UNSPECIFIED  1/3/2023    D50.9 IRON DEFICIENCY ANEMIA, UNSPECIFIED  1/3/2023    K21.9 GASTRO-ESOPHAGEAL REFLUX DISEASE WITHOUT ESOPHAGITIS  1/3/2023    S91.301D UNSPECIFIED OPEN WOUND, RIGHT FOOT, SUBSEQUENT ENCOUNTER  1/3/2023    R62.7 ADULT FAILURE TO THRIVE  1/3/2023    M62.522 MUSCLE WASTING AND ATROPHY, NEC, LEFT UPPER ARM  1/18/2023    L08.9 LOCAL INFECTION OF THE SKIN AND SUBCUTANEOUS TISSUE, UNSP  1/3/2023    Z74.1 NEED FOR ASSISTANCE WITH PERSONAL CARE  1/3/2023    R29.6 REPEATED FALLS  1/3/2023    R26.2 DIFFICULTY IN WALKING, NOT ELSEWHERE CLASSIFIED  1/3/2023    M62.81 MUSCLE WEAKNESS (GENERALIZED  1/3/2023    N18.4 CHRONIC KIDNEY DISEASE, STAGE 4 (SEVERE  There were no vitals taken for this visit.  Physical Exam  Current Vitals   BP: 124/66 mmHg  11/7/2023 14:41    Temp:97.4 °F  11/7/2023 14:41  Pulse:70 bpm  11/7/2023 14:41  Weight:143.6 Lbs  1/8/2024 06:25  Resp:16 Breaths/min  11/7/2023 14:41  BS:  O2:97 %  11/7/2023 14:41  Pain:0  1/10/2024 10:47    Appearance: Alert, oriented , cooperative,  in no acute distress. Well nourished & well hydrated.    Skin: Pressure ulcer stage second and third left leg calf pressure ulcer    Eyes: Allergic conjunctivitis    ENT: Allergic rhinitis  sinusitis neck: JVD plus  Pulmonary: Crackles rales rhonchi  Cardiac: Heart murmur  Abdomen: Soft, nontender, active bowel sounds.  No palpable organomegaly.  No rebound or guarding.  No CVA tenderness.    Genitourinary: Exam deferred.    Musculoskeletal: Hip pain back pain tenderness  Neurological: Anxiety depression dementia    Psychiatric: Appropriate mood and affect.     Assessment/Plan  Allergic rhinitis conjunctivitis  Hypertension hyperlipidemia  PAD CAD  Failure to thrive  Chronic anemia  Osteopenia osteoarthritis and debility nonspecific  Chronic kidney disease with chronic anemia    Recommendation plan as follow    Symptom management  Hydration  Artificial eyedrops Flonase Claritin  Anemia iron B12 folic acid  Insomnia melatonin  Arthritis acetaminophen  Mucinex  Anemia H&H 10.8 and 35 iron B12 folic acid supplement  CKD stage III BUN 44 creatinine 1.8 Farxiga 5 mg a day combined with      Problem List Items Addressed This Visit       GERD without esophagitis    Anemia due to stage 4 chronic kidney disease (CMS/HCC)    Allergic conjunctivitis of both eyes - Primary    Allergic sinusitis   This patient was seen for my regular monthly visit, nursing evaluations and nursing notes were reviewed, interim events are reviewed, interim concerns and messages were reviewed as we have communicated with nursing staff.  Any issues with the falls, skin care impairment, declining physical condition are reviewed and noted, diagnosis list were reviewed, list of medications were reviewed, living will related issues were reviewed, overall patient has been doing well, any declining in patient's condition or any change in patient's condition needs to be notified to physician promptly, discussed with nursing staff, if needed would communicate with family.  Patient stays confined here at the facility for long-term care, there are always concerns about long-term care related issues and concerns.  Nursing staff is trying their  best to keep patient safe, all sort of measures has been taken to keep patient safe and comfortable.    Reviewed laboratory  Iron B12 folic acid supplement  Farxiga  Vitamin C  Advised to check COVID test and ultrasound of kidney    Consultation taken to the wound care evaluation dietitian evaluation check the CBC BMP TSH protein level  Skin integrity:  Nursing to monitor skin integrity as patient is at risk for pressure injuries.  Wound care per nursing    See Facility notes for measurements/assessments  Turn and reposition Q 2 hours or more  Air mattress and when up in chair cushion reducing device  Dietician to evaluate and recommend:  Nutritional supplement:  Please monitor skin integrity and other pressure areas  Referral to wound clinic if appropriate:    PLAN:  Pt has been seen for follow up visit.  The patient is here at facility for PT/OT/ST to maximize strength, function, endurance and safety.  The patient is participating in therapy. Rocio Mendieta is making progress to the best of his/her ability.   Please see PT/OT/ST notes in the facility for detailed information regarding progression of patients progress.   Recent nursing evaluation and notes were reviewed.   Overall, patient is stable despite his/her chronic conditions.    #  Any decline or change in condition needs to be communicated with the physician or myself.    Discussion with nursing staff regarding ongoing care and management.  If needed, would communicate with family who are not present at this time.   There are no concerns at this time.  We will continue with the medications noted above.    We will continue to follow the patient here at the facility.    Discharge planning:   Discharge Home when goals are met.   Follow up with PCP in 1-2 weeks after discharge from facility.   [facility] to follow after discharge for continued PT/OT and Nursing.    *Please note that nursing facility, outside laboratory agency, and  [facility] do not interface.      Completion of the note was done through Dragon voice recognition technology and may include   unintended or grammatical errors which may not have been recognized when finalizing the note.     Time:    Hari Galvan MD         Electronically Signed By: Hari Galvan MD   1/10/24  4:57 PM

## 2024-01-10 NOTE — PROGRESS NOTES
Name: Rocio Mendieta  YOB: 1927    FOLLOW UP VISIT:   Allergies: Egg, Fish   Code Status: DNR-CC    Special Instructions: Skilled services for wounds, PT/OT care plan development and nursing observation and assessment. Document wounds, monitor for infection. Monitor VS and lab work as indicated.   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 STAGE 4 CHRONIC KIDNEY DISEASE, OR UNSPECIFIED CHRONIC KIDNEY DISEASE allergic rhinitis conjunctivitis  SUBJECTIVE:  89-year-old patient DNR CC evaluated for hypertension hyperlipidemia.  Patient sinus ear throat eyes advised artificial ophthalmic teardrops glycol Flonase Claritin normal and nasal spray  89-year-old patient DNR CC evaluated for kidney disease anemia skin irritation wound care evaluation advise regular house diet thin liquid consistency ammonium lactate cream application left lower calf wound care management iron B12 folic acid supplement mild dehydration with renal insufficiency increase fluid intake all nephrotoxic medication      WBC 6.79 H&H 10.8 and 35.1 BUN 44 creatinine 1.8 normal thyroid hemoglobin A1c 5.7    Anemia with chronic kidney disease    REVIEW OF SYSTEMS:   All review of systems are negative unless otherwise stated above under subjective.    LABS REVIEWED AT FACILITY:  Review  MEDICATIONS REVIEWED AT FACILITY:   Order Order Status Revised Last Ordered    Ferrous Sulfate Oral Tablet Delayed Release 324 (65 Fe) MG (Ferrous Sulfate) Active 1/4/2023     Sennosides-Docusate Sodium Oral Tablet 8.6-50 MG (Sennosides-Docusate Sodium) Active 1/25/2023     Fluticasone Propionate Nasal Suspension 50 MCG/ACT (Fluticasone Propionate (Nasal)) Active 1/4/2023   This order is potentially duplicated by other orders on the chart. Click to view details.    Acetaminophen Oral Tablet 325 MG (Acetaminophen) Active 2/15/2023     Melatonin Oral Tablet 3 MG  (Melatonin) Active 1/3/2023     Cholecalciferol Oral Tablet 25 MCG (1000 UT) (Cholecalciferol) Active 1/4/2023   This order is potentially duplicated by other orders on the chart. Click to view details.    Tylenol Extra Strength Oral Tablet 500 MG (Acetaminophen) Active 1/26/2023 1/26/2023    Saccharomyces boulardii Capsule 250 MG Active 3/16/2023 3/15/2023    Dicyclomine HCl Tablet 20 MG  Living will related issues reviewed-Code status:     OBJECTIVE: ICD-10 Short Description  1/3/2023    I12.9 HYPERTENSIVE CHRONIC KIDNEY DISEASE W STG 1-4/UNSP CHR KDNY  1/3/2023    I35.0 NONRHEUMATIC AORTIC (VALVE) STENOSIS  1/3/2023    I73.9 PERIPHERAL VASCULAR DISEASE, UNSPECIFIED  1/3/2023    D50.9 IRON DEFICIENCY ANEMIA, UNSPECIFIED  1/3/2023    K21.9 GASTRO-ESOPHAGEAL REFLUX DISEASE WITHOUT ESOPHAGITIS  1/3/2023    S91.301D UNSPECIFIED OPEN WOUND, RIGHT FOOT, SUBSEQUENT ENCOUNTER  1/3/2023    R62.7 ADULT FAILURE TO THRIVE  1/3/2023    M62.522 MUSCLE WASTING AND ATROPHY, NEC, LEFT UPPER ARM  1/18/2023    L08.9 LOCAL INFECTION OF THE SKIN AND SUBCUTANEOUS TISSUE, UNSP  1/3/2023    Z74.1 NEED FOR ASSISTANCE WITH PERSONAL CARE  1/3/2023    R29.6 REPEATED FALLS  1/3/2023    R26.2 DIFFICULTY IN WALKING, NOT ELSEWHERE CLASSIFIED  1/3/2023    M62.81 MUSCLE WEAKNESS (GENERALIZED  1/3/2023    N18.4 CHRONIC KIDNEY DISEASE, STAGE 4 (SEVERE  There were no vitals taken for this visit.  Physical Exam  Current Vitals   BP: 124/66 mmHg  11/7/2023 14:41    Temp:97.4 °F  11/7/2023 14:41  Pulse:70 bpm  11/7/2023 14:41  Weight:143.6 Lbs  1/8/2024 06:25  Resp:16 Breaths/min  11/7/2023 14:41  BS:  O2:97 %  11/7/2023 14:41  Pain:0  1/10/2024 10:47    Appearance: Alert, oriented , cooperative,  in no acute distress. Well nourished & well hydrated.    Skin: Pressure ulcer stage second and third left leg calf pressure ulcer    Eyes: Allergic conjunctivitis    ENT: Allergic rhinitis sinusitis neck: JVD plus  Pulmonary: Crackles rales rhonchi  Cardiac:  Heart murmur  Abdomen: Soft, nontender, active bowel sounds.  No palpable organomegaly.  No rebound or guarding.  No CVA tenderness.    Genitourinary: Exam deferred.    Musculoskeletal: Hip pain back pain tenderness  Neurological: Anxiety depression dementia    Psychiatric: Appropriate mood and affect.     Assessment/Plan   Allergic rhinitis conjunctivitis  Hypertension hyperlipidemia  PAD CAD  Failure to thrive  Chronic anemia  Osteopenia osteoarthritis and debility nonspecific  Chronic kidney disease with chronic anemia    Recommendation plan as follow    Symptom management  Hydration  Artificial eyedrops Flonase Claritin  Anemia iron B12 folic acid  Insomnia melatonin  Arthritis acetaminophen  Mucinex  Anemia H&H 10.8 and 35 iron B12 folic acid supplement  CKD stage III BUN 44 creatinine 1.8 Farxiga 5 mg a day combined with      Problem List Items Addressed This Visit       GERD without esophagitis    Anemia due to stage 4 chronic kidney disease (CMS/[facility])    Allergic conjunctivitis of both eyes - Primary    Allergic sinusitis   This patient was seen for my regular monthly visit, nursing evaluations and nursing notes were reviewed, interim events are reviewed, interim concerns and messages were reviewed as we have communicated with nursing staff.  Any issues with the falls, skin care impairment, declining physical condition are reviewed and noted, diagnosis list were reviewed, list of medications were reviewed, living will related issues were reviewed, overall patient has been doing well, any declining in patient's condition or any change in patient's condition needs to be notified to physician promptly, discussed with nursing staff, if needed would communicate with family.  Patient stays confined here at the facility for long-term care, there are always concerns about long-term care related issues and concerns.  Nursing staff is trying their best to keep patient safe, all sort of measures has been taken to keep  patient safe and comfortable.    Reviewed laboratory  Iron B12 folic acid supplement  Farxiga  Vitamin C  Advised to check COVID test and ultrasound of kidney    Consultation taken to the wound care evaluation dietitian evaluation check the CBC BMP TSH protein level  Skin integrity:  Nursing to monitor skin integrity as patient is at risk for pressure injuries.  Wound care per nursing    See Facility notes for measurements/assessments  Turn and reposition Q 2 hours or more  Air mattress and when up in chair cushion reducing device  Dietician to evaluate and recommend:  Nutritional supplement:  Please monitor skin integrity and other pressure areas  Referral to wound clinic if appropriate:    PLAN:  Pt has been seen for follow up visit.  The patient is here at facility for PT/OT/ST to maximize strength, function, endurance and safety.  The patient is participating in therapy. Rocio Mendieta is making progress to the best of his/her ability.   Please see PT/OT/ST notes in the facility for detailed information regarding progression of patients progress.   Recent nursing evaluation and notes were reviewed.   Overall, patient is stable despite his/her chronic conditions.    #  Any decline or change in condition needs to be communicated with the physician or myself.    Discussion with nursing staff regarding ongoing care and management.  If needed, would communicate with family who are not present at this time.   There are no concerns at this time.  We will continue with the medications noted above.    We will continue to follow the patient here at the facility.    Discharge planning:   Discharge Home when goals are met.   Follow up with PCP in 1-2 weeks after discharge from facility.   [facility] to follow after discharge for continued PT/OT and Nursing.    *Please note that nursing facility, outside laboratory agency, and  [facility] do not interface.     Completion of the note was done through Dragon voice recognition technology  and may include   unintended or grammatical errors which may not have been recognized when finalizing the note.     Time:    Hari Galvan MD

## 2024-03-16 ENCOUNTER — NURSING HOME VISIT (OUTPATIENT)
Dept: POST ACUTE CARE | Facility: EXTERNAL LOCATION | Age: 89
End: 2024-03-16
Payer: COMMERCIAL

## 2024-03-16 DIAGNOSIS — H10.13 ALLERGIC CONJUNCTIVITIS OF BOTH EYES: ICD-10-CM

## 2024-03-16 DIAGNOSIS — J30.9 ALLERGIC SINUSITIS: Primary | ICD-10-CM

## 2024-03-16 DIAGNOSIS — K21.9 GERD WITHOUT ESOPHAGITIS: ICD-10-CM

## 2024-03-16 DIAGNOSIS — N18.31 STAGE 3A CHRONIC KIDNEY DISEASE (MULTI): ICD-10-CM

## 2024-03-16 DIAGNOSIS — L89.223: ICD-10-CM

## 2024-03-16 DIAGNOSIS — D50.0 IRON DEFICIENCY ANEMIA DUE TO CHRONIC BLOOD LOSS: ICD-10-CM

## 2024-03-16 PROBLEM — N18.4 ANEMIA DUE TO STAGE 4 CHRONIC KIDNEY DISEASE (MULTI): Status: RESOLVED | Noted: 2023-05-07 | Resolved: 2024-03-16

## 2024-03-16 PROBLEM — J00 COMMON COLD: Status: RESOLVED | Noted: 2023-11-05 | Resolved: 2024-03-16

## 2024-03-16 PROBLEM — R62.51 FAILURE TO THRIVE (0-17): Status: RESOLVED | Noted: 2023-05-07 | Resolved: 2024-03-16

## 2024-03-16 PROBLEM — D63.1 ANEMIA DUE TO STAGE 4 CHRONIC KIDNEY DISEASE (MULTI): Status: RESOLVED | Noted: 2023-05-07 | Resolved: 2024-03-16

## 2024-03-16 PROBLEM — R63.4 WEIGHT LOSS, NON-INTENTIONAL: Status: RESOLVED | Noted: 2023-05-07 | Resolved: 2024-03-16

## 2024-03-16 PROBLEM — I73.9 PERIPHERAL VASCULAR DISEASE, UNSPECIFIED (CMS-HCC): Status: ACTIVE | Noted: 2024-03-16

## 2024-03-16 PROCEDURE — 99309 SBSQ NF CARE MODERATE MDM 30: CPT | Performed by: INTERNAL MEDICINE

## 2024-03-16 NOTE — PROGRESS NOTES
Name: Rocio Mendieta  YOB: 1927    FOLLOW UP VISIT:   Allergies: Egg, Fish   Code Status: DNR-CC    Special Instructions: Skilled services for wounds, PT/OT care plan development and nursing observation and assessment. Document wounds, monitor for infection. Monitor VS and lab work as indicated.   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  Potassium 4.6.  BUN 46.  Protein 5.9.  Albumin 3.1.      WBC 6.79 H&H 10.8 and 35.1 BUN 44 creatinine 1.8 normal thyroid hemoglobin A1c 5.7    Anemia with chronic kidney disease    REVIEW OF SYSTEMS:   All review of systems are negative unless otherwise stated above under subjective.    LABS REVIEWED AT FACILITY:  Review  MEDICATIONS REVIEWED AT FACILITY:   Order Order Status Revised Last Ordered    Ferrous Sulfate Oral Tablet Delayed Release 324 (65 Fe) MG (Ferrous Sulfate) Active 1/4/2023     Sennosides-Docusate Sodium Oral Tablet 8.6-50 MG (Sennosides-Docusate Sodium) Active 1/25/2023     Fluticasone Propionate Nasal Suspension 50 MCG/ACT (Fluticasone Propionate (Nasal)) Active 1/4/2023   This order is potentially duplicated by other orders on the chart. Click to view details.    Acetaminophen Oral Tablet 325 MG (Acetaminophen) Active 2/15/2023     Melatonin Oral Tablet 3 MG (Melatonin) Active 1/3/2023      Cholecalciferol Oral Tablet 25 MCG (1000 UT) (Cholecalciferol) Active 1/4/2023   This order is potentially duplicated by other orders on the chart. Click to view details.    Tylenol Extra Strength Oral Tablet 500 MG (Acetaminophen) Active 1/26/2023 1/26/2023    Saccharomyces boulardii Capsule 250 MG Active 3/16/2023 3/15/2023    Dicyclomine HCl Tablet 20 MG  Living will related issues reviewed-Code status:     OBJECTIVE: ICD-10 Short Description  1/3/2023    I12.9 HYPERTENSIVE CHRONIC KIDNEY DISEASE W STG 1-4/UNSP CHR KDNY  1/3/2023    I35.0 NONRHEUMATIC AORTIC (VALVE) STENOSIS  1/3/2023    I73.9 PERIPHERAL VASCULAR DISEASE, UNSPECIFIED  1/3/2023    D50.9 IRON DEFICIENCY ANEMIA, UNSPECIFIED  1/3/2023    K21.9 GASTRO-ESOPHAGEAL REFLUX DISEASE WITHOUT ESOPHAGITIS  1/3/2023    S91.301D UNSPECIFIED OPEN WOUND, RIGHT FOOT, SUBSEQUENT ENCOUNTER  1/3/2023    R62.7 ADULT FAILURE TO THRIVE  1/3/2023    M62.522 MUSCLE WASTING AND ATROPHY, NEC, LEFT UPPER ARM  1/18/2023    L08.9 LOCAL INFECTION OF THE SKIN AND SUBCUTANEOUS TISSUE, UNSP  1/3/2023    Z74.1 NEED FOR ASSISTANCE WITH PERSONAL CARE  1/3/2023    R29.6 REPEATED FALLS  1/3/2023    R26.2 DIFFICULTY IN WALKING, NOT ELSEWHERE CLASSIFIED  1/3/2023    M62.81 MUSCLE WEAKNESS (GENERALIZED  1/3/2023    N18.4 CHRONIC KIDNEY DISEASE, STAGE 4 (SEVERE  There were no vitals taken for this visit.  Physical Exam  Current Vitals   BP: 126/66 mmHg  2/3/2024 11:52  Temp:98.3 °F  2/3/2024 11:52  Pulse:70 bpm  2/3/2024 11:52    Weight:145.4 Lbs  3/5/2024 11:38  Resp:20 Breaths/min  2/3/2024 11:52  BS:  O2:96 %  2/3/2024 11:52  Pain:0  3/16/2024 11:10  Appearance: Alert, oriented , cooperative,  in no acute distress. Well nourished & well hydrated.    Skin: Pressure ulcer stage second and third left leg calf pressure ulcer    Eyes: Allergic conjunctivitis    ENT: Allergic rhinitis sinusitis neck: JVD plus  Pulmonary: Crackles rales rhonchi  Cardiac: Heart murmur  Abdomen: Soft, nontender,  active bowel sounds.  No palpable organomegaly.  No rebound or guarding.  No CVA tenderness.    Genitourinary: Exam deferred.    Musculoskeletal: Hip pain back pain tenderness  Neurological: Anxiety depression dementia    Psychiatric: Appropriate mood and affect.     Assessment/Plan   Allergic rhinitis conjunctivitis  Hypertension hyperlipidemia  ckd  PAD CAD  Failure to thrive  Chronic anemia  Osteopenia osteoarthritis and debility nonspecific  Chronic kidney disease with chronic anemia    Recommendation plan as follow    Symptom management  Hydration  Artificial eyedrops Flonase Claritin  Anemia iron B12 folic acid  Insomnia melatonin  Arthritis acetaminophen  Mucinex  Anemia H&H 10.8 and 35 iron B12 folic acid supplement  CKD stage III BUN 44 creatinine 1.8 Farxiga 5 mg a day combined with      Problem List Items Addressed This Visit       GERD without esophagitis    Allergic conjunctivitis of both eyes    Allergic sinusitis - Primary    Pressure injury of left thigh, stage 3 (CMS/HCC)    Iron deficiency anemia due to chronic blood loss     Slow Fe plus vitamin C         Stage 3a chronic kidney disease (CMS/HCC)     CKD and various stages of CKD and significance explained, CKD is very common and rising diagnosis among [place] adults. Main culprits for CKD etiology needs to be attended well. GFR values explained. Nephrotoxic agents has to be avoided, plenty of water consumption is always beneficial. Avoid NSAIDs, always check with MD about usage of OTC medications or any other Rx given by other providers. GFR less then 10 usually will need renal replacement therapy. Episodic check on renal functions needed. Always ask MD about GFR at next visit. Avoid dehydration.         This patient was seen for my regular monthly visit, nursing evaluations and nursing notes were reviewed, interim events are reviewed, interim concerns and messages were reviewed as we have communicated with nursing staff.  Any issues with the falls,  skin care impairment, declining physical condition are reviewed and noted, diagnosis list were reviewed, list of medications were reviewed, living will related issues were reviewed, overall patient has been doing well, any declining in patient's condition or any change in patient's condition needs to be notified to physician promptly, discussed with nursing staff, if needed would communicate with family.  Patient stays confined here at the facility for long-term care, there are always concerns about long-term care related issues and concerns.  Nursing staff is trying their best to keep patient safe, all sort of measures has been taken to keep patient safe and comfortable.    Reviewed laboratory  Iron B12 folic acid supplement  Farxiga  Vitamin C  Advised to check COVID test and ultrasound of kidney    Consultation taken to the wound care evaluation dietitian evaluation check the CBC BMP TSH protein level  Skin integrity:  Nursing to monitor skin integrity as patient is at risk for pressure injuries.  Wound care per nursing    See Facility notes for measurements/assessments  Turn and reposition Q 2 hours or more  Air mattress and when up in chair cushion reducing device  Dietician to evaluate and recommend:  Nutritional supplement:  Please monitor skin integrity and other pressure areas  Referral to wound clinic if appropriate:    PLAN:  Pt has been seen for follow up visit.  The patient is here at facility for PT/OT/ST to maximize strength, function, endurance and safety.  The patient is participating in therapy. Rocio Mendieta is making progress to the best of his/her ability.   Please see PT/OT/ST notes in the facility for detailed information regarding progression of patients progress.   Recent nursing evaluation and notes were reviewed.   Overall, patient is stable despite his/her chronic conditions.    #  Any decline or change in condition needs to be communicated with the physician or myself.    Discussion with  nursing staff regarding ongoing care and management.  If needed, would communicate with family who are not present at this time.   There are no concerns at this time.  We will continue with the medications noted above.    We will continue to follow the patient here at the facility.    Discharge planning:   Discharge Home when goals are met.   Follow up with PCP in 1-2 weeks after discharge from facility.   C to follow after discharge for continued PT/OT and Nursing.    *Please note that nursing facility, outside laboratory agency, and [facility] do not interface.     Completion of the note was done through Dragon voice recognition technology and may include   unintended or grammatical errors which may not have been recognized when finalizing the note.     Time:    Hari Galvan MD

## 2024-03-16 NOTE — LETTER
Patient: Rocio Mendieta  : 1927    Encounter Date: 2024    Name: Rocio Mendieta  YOB: 1927    FOLLOW UP VISIT:   Allergies: Egg, Fish   Code Status: DNR-CC    Special Instructions: Skilled services for wounds, PT/OT care plan development and nursing observation and assessment. Document wounds, monitor for infection. Monitor VS and lab work as indicated.   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 STAGE 4 CHRONIC KIDNEY DISEASE, OR UNSPECIFIED CHRONIC KIDNEY DISEASE allergic rhinitis conjunctivitis  SUBJECTIVE:  89-year-old patient DNR CC evaluated for multiple wound associate with the conjunctivitis allergic rhinitis sinusitis and protein calorie malnutrition    Advise continue ammonium lactate external cream application plus artificial tears in eyes Allegra plus Flonase wound care evaluation for multiple pressure ulcers    2024 chest x-ray normal no pneumonia no TB    Anemia multifactorial H&H 10.8 and 35.5 MCHC 30.4 with normal platelet and WBC.  Potassium 4.6.  BUN 46.  Protein 5.9.  Albumin 3.1.      WBC 6.79 H&H 10.8 and 35.1 BUN 44 creatinine 1.8 normal thyroid hemoglobin A1c 5.7    Anemia with chronic kidney disease    REVIEW OF SYSTEMS:   All review of systems are negative unless otherwise stated above under subjective.    LABS REVIEWED AT FACILITY:  Review  MEDICATIONS REVIEWED AT FACILITY:   Order Order Status Revised Last Ordered    Ferrous Sulfate Oral Tablet Delayed Release 324 (65 Fe) MG (Ferrous Sulfate) Active 2023     Sennosides-Docusate Sodium Oral Tablet 8.6-50 MG (Sennosides-Docusate Sodium) Active 2023     Fluticasone Propionate Nasal Suspension 50 MCG/ACT (Fluticasone Propionate (Nasal)) Active 2023   This order is potentially duplicated by other orders on the chart. Click to view details.    Acetaminophen Oral Tablet 325 MG  (Acetaminophen) Active 2/15/2023     Melatonin Oral Tablet 3 MG (Melatonin) Active 1/3/2023     Cholecalciferol Oral Tablet 25 MCG (1000 UT) (Cholecalciferol) Active 1/4/2023   This order is potentially duplicated by other orders on the chart. Click to view details.    Tylenol Extra Strength Oral Tablet 500 MG (Acetaminophen) Active 1/26/2023 1/26/2023    Saccharomyces boulardii Capsule 250 MG Active 3/16/2023 3/15/2023    Dicyclomine HCl Tablet 20 MG  Living will related issues reviewed-Code status:     OBJECTIVE: ICD-10 Short Description  1/3/2023    I12.9 HYPERTENSIVE CHRONIC KIDNEY DISEASE W STG 1-4/UNSP CHR KDNY  1/3/2023    I35.0 NONRHEUMATIC AORTIC (VALVE) STENOSIS  1/3/2023    I73.9 PERIPHERAL VASCULAR DISEASE, UNSPECIFIED  1/3/2023    D50.9 IRON DEFICIENCY ANEMIA, UNSPECIFIED  1/3/2023    K21.9 GASTRO-ESOPHAGEAL REFLUX DISEASE WITHOUT ESOPHAGITIS  1/3/2023    S91.301D UNSPECIFIED OPEN WOUND, RIGHT FOOT, SUBSEQUENT ENCOUNTER  1/3/2023    R62.7 ADULT FAILURE TO THRIVE  1/3/2023    M62.522 MUSCLE WASTING AND ATROPHY, NEC, LEFT UPPER ARM  1/18/2023    L08.9 LOCAL INFECTION OF THE SKIN AND SUBCUTANEOUS TISSUE, UNSP  1/3/2023    Z74.1 NEED FOR ASSISTANCE WITH PERSONAL CARE  1/3/2023    R29.6 REPEATED FALLS  1/3/2023    R26.2 DIFFICULTY IN WALKING, NOT ELSEWHERE CLASSIFIED  1/3/2023    M62.81 MUSCLE WEAKNESS (GENERALIZED  1/3/2023    N18.4 CHRONIC KIDNEY DISEASE, STAGE 4 (SEVERE  There were no vitals taken for this visit.  Physical Exam  Current Vitals   BP: 126/66 mmHg  2/3/2024 11:52  Temp:98.3 °F  2/3/2024 11:52  Pulse:70 bpm  2/3/2024 11:52    Weight:145.4 Lbs  3/5/2024 11:38  Resp:20 Breaths/min  2/3/2024 11:52  BS:  O2:96 %  2/3/2024 11:52  Pain:0  3/16/2024 11:10  Appearance: Alert, oriented , cooperative,  in no acute distress. Well nourished & well hydrated.    Skin: Pressure ulcer stage second and third left leg calf pressure ulcer    Eyes: Allergic conjunctivitis    ENT: Allergic rhinitis sinusitis  neck: JVD plus  Pulmonary: Crackles rales rhonchi  Cardiac: Heart murmur  Abdomen: Soft, nontender, active bowel sounds.  No palpable organomegaly.  No rebound or guarding.  No CVA tenderness.    Genitourinary: Exam deferred.    Musculoskeletal: Hip pain back pain tenderness  Neurological: Anxiety depression dementia    Psychiatric: Appropriate mood and affect.     Assessment/Plan  Allergic rhinitis conjunctivitis  Hypertension hyperlipidemia  ckd  PAD CAD  Failure to thrive  Chronic anemia  Osteopenia osteoarthritis and debility nonspecific  Chronic kidney disease with chronic anemia    Recommendation plan as follow    Symptom management  Hydration  Artificial eyedrops Flonase Claritin  Anemia iron B12 folic acid  Insomnia melatonin  Arthritis acetaminophen  Mucinex  Anemia H&H 10.8 and 35 iron B12 folic acid supplement  CKD stage III BUN 44 creatinine 1.8 Farxiga 5 mg a day combined with      Problem List Items Addressed This Visit       GERD without esophagitis    Allergic conjunctivitis of both eyes    Allergic sinusitis - Primary    Pressure injury of left thigh, stage 3 (CMS/HCC)    Iron deficiency anemia due to chronic blood loss     Slow Fe plus vitamin C         Stage 3a chronic kidney disease (CMS/HCC)     CKD and various stages of CKD and significance explained, CKD is very common and rising diagnosis among [place] adults. Main culprits for CKD etiology needs to be attended well. GFR values explained. Nephrotoxic agents has to be avoided, plenty of water consumption is always beneficial. Avoid NSAIDs, always check with MD about usage of OTC medications or any other Rx given by other providers. GFR less then 10 usually will need renal replacement therapy. Episodic check on renal functions needed. Always ask MD about GFR at next visit. Avoid dehydration.         This patient was seen for my regular monthly visit, nursing evaluations and nursing notes were reviewed, interim events are reviewed, interim concerns  and messages were reviewed as we have communicated with nursing staff.  Any issues with the falls, skin care impairment, declining physical condition are reviewed and noted, diagnosis list were reviewed, list of medications were reviewed, living will related issues were reviewed, overall patient has been doing well, any declining in patient's condition or any change in patient's condition needs to be notified to physician promptly, discussed with nursing staff, if needed would communicate with family.  Patient stays confined here at the facility for long-term care, there are always concerns about long-term care related issues and concerns.  Nursing staff is trying their best to keep patient safe, all sort of measures has been taken to keep patient safe and comfortable.    Reviewed laboratory  Iron B12 folic acid supplement  Farxiga  Vitamin C  Advised to check COVID test and ultrasound of kidney    Consultation taken to the wound care evaluation dietitian evaluation check the CBC BMP TSH protein level  Skin integrity:  Nursing to monitor skin integrity as patient is at risk for pressure injuries.  Wound care per nursing    See Facility notes for measurements/assessments  Turn and reposition Q 2 hours or more  Air mattress and when up in chair cushion reducing device  Dietician to evaluate and recommend:  Nutritional supplement:  Please monitor skin integrity and other pressure areas  Referral to wound clinic if appropriate:    PLAN:  Pt has been seen for follow up visit.  The patient is here at facility for PT/OT/ST to maximize strength, function, endurance and safety.  The patient is participating in therapy. Rocio Mendieta is making progress to the best of his/her ability.   Please see PT/OT/ST notes in the facility for detailed information regarding progression of patients progress.   Recent nursing evaluation and notes were reviewed.   Overall, patient is stable despite his/her chronic conditions.    #  Any  decline or change in condition needs to be communicated with the physician or myself.    Discussion with nursing staff regarding ongoing care and management.  If needed, would communicate with family who are not present at this time.   There are no concerns at this time.  We will continue with the medications noted above.    We will continue to follow the patient here at the facility.    Discharge planning:   Discharge Home when goals are met.   Follow up with PCP in 1-2 weeks after discharge from facility.   C to follow after discharge for continued PT/OT and Nursing.    *Please note that nursing facility, outside laboratory agency, and  [facility] do not interface.     Completion of the note was done through Dragon voice recognition technology and may include   unintended or grammatical errors which may not have been recognized when finalizing the note.     Time:    Hari Galvan MD         Electronically Signed By: Hari Galvan MD   3/16/24 12:28 PM